# Patient Record
Sex: FEMALE | Race: WHITE | NOT HISPANIC OR LATINO | Employment: OTHER | URBAN - METROPOLITAN AREA
[De-identification: names, ages, dates, MRNs, and addresses within clinical notes are randomized per-mention and may not be internally consistent; named-entity substitution may affect disease eponyms.]

---

## 2017-01-19 ENCOUNTER — OFFICE VISIT - HEALTHEAST (OUTPATIENT)
Dept: FAMILY MEDICINE | Facility: CLINIC | Age: 67
End: 2017-01-19

## 2017-01-19 ENCOUNTER — RECORDS - HEALTHEAST (OUTPATIENT)
Dept: GENERAL RADIOLOGY | Facility: CLINIC | Age: 67
End: 2017-01-19

## 2017-01-19 DIAGNOSIS — M79.602 LEFT ARM PAIN: ICD-10-CM

## 2017-01-19 DIAGNOSIS — M79.602 PAIN IN LEFT ARM: ICD-10-CM

## 2017-01-19 ASSESSMENT — MIFFLIN-ST. JEOR: SCORE: 1207.98

## 2017-05-18 ENCOUNTER — COMMUNICATION - HEALTHEAST (OUTPATIENT)
Dept: FAMILY MEDICINE | Facility: CLINIC | Age: 67
End: 2017-05-18

## 2017-05-18 DIAGNOSIS — E03.9 UNSPECIFIED HYPOTHYROIDISM: ICD-10-CM

## 2017-05-18 DIAGNOSIS — E78.5 HYPERLIPIDEMIA: ICD-10-CM

## 2017-08-28 ENCOUNTER — COMMUNICATION - HEALTHEAST (OUTPATIENT)
Dept: FAMILY MEDICINE | Facility: CLINIC | Age: 67
End: 2017-08-28

## 2017-08-28 DIAGNOSIS — E78.5 HYPERLIPIDEMIA: ICD-10-CM

## 2017-08-29 ENCOUNTER — COMMUNICATION - HEALTHEAST (OUTPATIENT)
Dept: FAMILY MEDICINE | Facility: CLINIC | Age: 67
End: 2017-08-29

## 2017-08-29 DIAGNOSIS — E78.5 HYPERLIPIDEMIA: ICD-10-CM

## 2017-09-01 ENCOUNTER — COMMUNICATION - HEALTHEAST (OUTPATIENT)
Dept: FAMILY MEDICINE | Facility: CLINIC | Age: 67
End: 2017-09-01

## 2017-09-01 DIAGNOSIS — E78.5 HYPERLIPIDEMIA: ICD-10-CM

## 2017-09-05 ENCOUNTER — COMMUNICATION - HEALTHEAST (OUTPATIENT)
Dept: FAMILY MEDICINE | Facility: CLINIC | Age: 67
End: 2017-09-05

## 2017-09-07 ENCOUNTER — OFFICE VISIT - HEALTHEAST (OUTPATIENT)
Dept: FAMILY MEDICINE | Facility: CLINIC | Age: 67
End: 2017-09-07

## 2017-09-07 DIAGNOSIS — M89.9 DISORDER OF BONE AND CARTILAGE: ICD-10-CM

## 2017-09-07 DIAGNOSIS — Z23 PNEUMOCOCCAL VACCINATION GIVEN: ICD-10-CM

## 2017-09-07 DIAGNOSIS — E78.5 HYPERLIPIDEMIA, UNSPECIFIED HYPERLIPIDEMIA TYPE: ICD-10-CM

## 2017-09-07 DIAGNOSIS — M94.9 DISORDER OF BONE AND CARTILAGE: ICD-10-CM

## 2017-09-07 DIAGNOSIS — E03.9 HYPOTHYROIDISM, UNSPECIFIED TYPE: ICD-10-CM

## 2017-09-07 DIAGNOSIS — Z12.31 VISIT FOR SCREENING MAMMOGRAM: ICD-10-CM

## 2017-09-07 DIAGNOSIS — Z76.89 ENCOUNTER TO ESTABLISH CARE: ICD-10-CM

## 2017-09-07 DIAGNOSIS — Z23 FLU VACCINE NEED: ICD-10-CM

## 2017-09-07 DIAGNOSIS — E66.3 OVERWEIGHT (BMI 25.0-29.9): ICD-10-CM

## 2017-09-07 LAB
CHOLEST SERPL-MCNC: 291 MG/DL
FASTING STATUS PATIENT QL REPORTED: YES
HDLC SERPL-MCNC: 70 MG/DL
LDLC SERPL CALC-MCNC: 191 MG/DL
TRIGL SERPL-MCNC: 150 MG/DL

## 2017-09-07 ASSESSMENT — MIFFLIN-ST. JEOR: SCORE: 1238.71

## 2017-09-11 ENCOUNTER — COMMUNICATION - HEALTHEAST (OUTPATIENT)
Dept: FAMILY MEDICINE | Facility: CLINIC | Age: 67
End: 2017-09-11

## 2017-09-11 DIAGNOSIS — E03.9 UNSPECIFIED HYPOTHYROIDISM: ICD-10-CM

## 2017-09-21 ENCOUNTER — RECORDS - HEALTHEAST (OUTPATIENT)
Dept: BONE DENSITY | Facility: CLINIC | Age: 67
End: 2017-09-21

## 2017-09-21 ENCOUNTER — HOSPITAL ENCOUNTER (OUTPATIENT)
Dept: MAMMOGRAPHY | Facility: CLINIC | Age: 67
Discharge: HOME | End: 2017-09-21
Attending: NURSE PRACTITIONER

## 2017-09-21 ENCOUNTER — RECORDS - HEALTHEAST (OUTPATIENT)
Dept: ADMINISTRATIVE | Facility: OTHER | Age: 67
End: 2017-09-21

## 2017-09-21 DIAGNOSIS — Z12.31 VISIT FOR SCREENING MAMMOGRAM: ICD-10-CM

## 2017-09-21 DIAGNOSIS — M89.9 DISORDER OF BONE, UNSPECIFIED: ICD-10-CM

## 2017-09-21 DIAGNOSIS — M94.9 DISORDER OF CARTILAGE, UNSPECIFIED: ICD-10-CM

## 2018-01-19 ENCOUNTER — COMMUNICATION - HEALTHEAST (OUTPATIENT)
Dept: FAMILY MEDICINE | Facility: CLINIC | Age: 68
End: 2018-01-19

## 2018-01-19 DIAGNOSIS — E03.9 HYPOTHYROIDISM: ICD-10-CM

## 2018-01-19 DIAGNOSIS — E78.5 HYPERLIPEMIA: ICD-10-CM

## 2018-03-27 ENCOUNTER — COMMUNICATION - HEALTHEAST (OUTPATIENT)
Dept: FAMILY MEDICINE | Facility: CLINIC | Age: 68
End: 2018-03-27

## 2018-03-27 DIAGNOSIS — E03.9 HYPOTHYROIDISM: ICD-10-CM

## 2018-03-27 DIAGNOSIS — E78.5 HYPERLIPEMIA: ICD-10-CM

## 2018-09-07 ENCOUNTER — AMBULATORY - HEALTHEAST (OUTPATIENT)
Dept: FAMILY MEDICINE | Facility: CLINIC | Age: 68
End: 2018-09-07

## 2018-09-07 ENCOUNTER — RECORDS - HEALTHEAST (OUTPATIENT)
Dept: GENERAL RADIOLOGY | Facility: CLINIC | Age: 68
End: 2018-09-07

## 2018-09-07 ENCOUNTER — OFFICE VISIT - HEALTHEAST (OUTPATIENT)
Dept: FAMILY MEDICINE | Facility: CLINIC | Age: 68
End: 2018-09-07

## 2018-09-07 DIAGNOSIS — M25.532 LEFT WRIST PAIN: ICD-10-CM

## 2018-09-07 DIAGNOSIS — E78.5 HYPERLIPEMIA: ICD-10-CM

## 2018-09-07 DIAGNOSIS — Z23 NEED FOR VACCINATION: ICD-10-CM
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
## 2018-09-07 DIAGNOSIS — E55.9 VITAMIN D DEFICIENCY: ICD-10-CM

## 2018-09-07 DIAGNOSIS — E03.9 HYPOTHYROIDISM, UNSPECIFIED TYPE: ICD-10-CM

## 2018-09-07 DIAGNOSIS — M25.532 PAIN IN LEFT WRIST: ICD-10-CM

## 2018-09-07 DIAGNOSIS — Z00.00 ANNUAL PHYSICAL EXAM: ICD-10-CM

## 2018-09-07 DIAGNOSIS — Z00.00 ROUTINE GENERAL MEDICAL EXAMINATION AT A HEALTH CARE FACILITY: ICD-10-CM

## 2018-09-07 DIAGNOSIS — S62.102A FRACTURE OF WRIST, LEFT, CLOSED, INITIAL ENCOUNTER: ICD-10-CM

## 2018-09-07 DIAGNOSIS — L91.8 SKIN TAG: ICD-10-CM
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
## 2018-09-07 LAB
ALBUMIN SERPL-MCNC: 4.3 G/DL (ref 3.5–5)
ALP SERPL-CCNC: 113 U/L (ref 45–120)
ALT SERPL W P-5'-P-CCNC: 19 U/L (ref 0–45)
ANION GAP SERPL CALCULATED.3IONS-SCNC: 13 MMOL/L (ref 5–18)
AST SERPL W P-5'-P-CCNC: 24 U/L (ref 0–40)
BILIRUB SERPL-MCNC: 0.6 MG/DL (ref 0–1)
BUN SERPL-MCNC: 14 MG/DL (ref 8–22)
CALCIUM SERPL-MCNC: 10.8 MG/DL (ref 8.5–10.5)
CHLORIDE BLD-SCNC: 101 MMOL/L (ref 98–107)
CHOLEST SERPL-MCNC: 284 MG/DL
CO2 SERPL-SCNC: 25 MMOL/L (ref 22–31)
CREAT SERPL-MCNC: 0.73 MG/DL (ref 0.6–1.1)
FASTING STATUS PATIENT QL REPORTED: NO
GFR SERPL CREATININE-BSD FRML MDRD: >60 ML/MIN/1.73M2
GLUCOSE BLD-MCNC: 82 MG/DL (ref 70–125)
HDLC SERPL-MCNC: 72 MG/DL
HGB BLD-MCNC: 13.3 G/DL (ref 12–16)
LDLC SERPL CALC-MCNC: 186 MG/DL
POTASSIUM BLD-SCNC: 4.2 MMOL/L (ref 3.5–5)
PROT SERPL-MCNC: 7.6 G/DL (ref 6–8)
SODIUM SERPL-SCNC: 139 MMOL/L (ref 136–145)
TRIGL SERPL-MCNC: 132 MG/DL
TSH SERPL DL<=0.005 MIU/L-ACNC: 0.38 UIU/ML (ref 0.3–5)

## 2018-09-07 ASSESSMENT — MIFFLIN-ST. JEOR: SCORE: 1226.69

## 2018-09-10 LAB
25(OH)D3 SERPL-MCNC: 31.5 NG/ML (ref 30–80)
25(OH)D3 SERPL-MCNC: 31.5 NG/ML (ref 30–80)

## 2018-09-11 ENCOUNTER — COMMUNICATION - HEALTHEAST (OUTPATIENT)
Dept: FAMILY MEDICINE | Facility: CLINIC | Age: 68
End: 2018-09-11

## 2018-09-17 ENCOUNTER — RECORDS - HEALTHEAST (OUTPATIENT)
Dept: ADMINISTRATIVE | Facility: OTHER | Age: 68
End: 2018-09-17

## 2018-09-25 ENCOUNTER — HOSPITAL ENCOUNTER (OUTPATIENT)
Dept: MAMMOGRAPHY | Facility: CLINIC | Age: 68
Discharge: HOME | End: 2018-09-25
Attending: NURSE PRACTITIONER

## 2018-09-25 DIAGNOSIS — Z12.31 VISIT FOR SCREENING MAMMOGRAM: ICD-10-CM

## 2018-10-04 ENCOUNTER — RECORDS - HEALTHEAST (OUTPATIENT)
Dept: ADMINISTRATIVE | Facility: OTHER | Age: 68
End: 2018-10-04

## 2018-10-05 ENCOUNTER — COMMUNICATION - HEALTHEAST (OUTPATIENT)
Dept: FAMILY MEDICINE | Facility: CLINIC | Age: 68
End: 2018-10-05

## 2018-10-05 DIAGNOSIS — E03.9 HYPOTHYROIDISM: ICD-10-CM

## 2018-10-11 ENCOUNTER — COMMUNICATION - HEALTHEAST (OUTPATIENT)
Dept: FAMILY MEDICINE | Facility: CLINIC | Age: 68
End: 2018-10-11

## 2018-10-11 DIAGNOSIS — E78.5 HYPERLIPEMIA: ICD-10-CM

## 2018-10-25 ENCOUNTER — RECORDS - HEALTHEAST (OUTPATIENT)
Dept: ADMINISTRATIVE | Facility: OTHER | Age: 68
End: 2018-10-25

## 2019-01-09 ENCOUNTER — COMMUNICATION - HEALTHEAST (OUTPATIENT)
Dept: FAMILY MEDICINE | Facility: CLINIC | Age: 69
End: 2019-01-09

## 2019-03-12 ENCOUNTER — COMMUNICATION - HEALTHEAST (OUTPATIENT)
Dept: SCHEDULING | Facility: CLINIC | Age: 69
End: 2019-03-12

## 2019-04-06 ENCOUNTER — COMMUNICATION - HEALTHEAST (OUTPATIENT)
Dept: SCHEDULING | Facility: CLINIC | Age: 69
End: 2019-04-06

## 2019-04-06 DIAGNOSIS — E78.5 HYPERLIPEMIA: ICD-10-CM

## 2019-04-26 ENCOUNTER — COMMUNICATION - HEALTHEAST (OUTPATIENT)
Dept: SCHEDULING | Facility: CLINIC | Age: 69
End: 2019-04-26

## 2019-04-26 DIAGNOSIS — E78.5 HYPERLIPEMIA: ICD-10-CM

## 2019-05-31 ENCOUNTER — OFFICE VISIT - HEALTHEAST (OUTPATIENT)
Dept: FAMILY MEDICINE | Facility: CLINIC | Age: 69
End: 2019-05-31

## 2019-05-31 DIAGNOSIS — M25.511 ACUTE PAIN OF RIGHT SHOULDER: ICD-10-CM

## 2019-05-31 DIAGNOSIS — M79.621 PAIN OF RIGHT UPPER ARM: ICD-10-CM

## 2019-05-31 RX ORDER — LATANOPROST 50 UG/ML
SOLUTION/ DROPS OPHTHALMIC
Status: SHIPPED | COMMUNITY
Start: 2019-05-30 | End: 2023-10-11

## 2019-05-31 ASSESSMENT — MIFFLIN-ST. JEOR: SCORE: 1244.83

## 2019-06-04 ENCOUNTER — HOSPITAL ENCOUNTER (OUTPATIENT)
Dept: MRI IMAGING | Facility: CLINIC | Age: 69
Discharge: HOME | End: 2019-06-04
Attending: NURSE PRACTITIONER

## 2019-06-04 DIAGNOSIS — M25.511 ACUTE PAIN OF RIGHT SHOULDER: ICD-10-CM

## 2019-06-06 ENCOUNTER — COMMUNICATION - HEALTHEAST (OUTPATIENT)
Dept: FAMILY MEDICINE | Facility: CLINIC | Age: 69
End: 2019-06-06

## 2019-06-06 ENCOUNTER — AMBULATORY - HEALTHEAST (OUTPATIENT)
Dept: FAMILY MEDICINE | Facility: CLINIC | Age: 69
End: 2019-06-06

## 2019-06-07 ENCOUNTER — RECORDS - HEALTHEAST (OUTPATIENT)
Dept: ADMINISTRATIVE | Facility: OTHER | Age: 69
End: 2019-06-07

## 2019-06-17 ENCOUNTER — COMMUNICATION - HEALTHEAST (OUTPATIENT)
Dept: SCHEDULING | Facility: CLINIC | Age: 69
End: 2019-06-17

## 2019-06-17 ENCOUNTER — COMMUNICATION - HEALTHEAST (OUTPATIENT)
Dept: FAMILY MEDICINE | Facility: CLINIC | Age: 69
End: 2019-06-17

## 2019-06-18 ENCOUNTER — RECORDS - HEALTHEAST (OUTPATIENT)
Dept: ADMINISTRATIVE | Facility: OTHER | Age: 69
End: 2019-06-18

## 2019-06-26 ENCOUNTER — COMMUNICATION - HEALTHEAST (OUTPATIENT)
Dept: FAMILY MEDICINE | Facility: CLINIC | Age: 69
End: 2019-06-26

## 2019-06-26 DIAGNOSIS — E78.5 HYPERLIPEMIA: ICD-10-CM

## 2019-08-02 ENCOUNTER — RECORDS - HEALTHEAST (OUTPATIENT)
Dept: ADMINISTRATIVE | Facility: OTHER | Age: 69
End: 2019-08-02

## 2019-09-17 ENCOUNTER — OFFICE VISIT - HEALTHEAST (OUTPATIENT)
Dept: FAMILY MEDICINE | Facility: CLINIC | Age: 69
End: 2019-09-17

## 2019-09-17 ENCOUNTER — RECORDS - HEALTHEAST (OUTPATIENT)
Dept: ADMINISTRATIVE | Facility: OTHER | Age: 69
End: 2019-09-17

## 2019-09-17 DIAGNOSIS — Z00.00 ROUTINE GENERAL MEDICAL EXAMINATION AT A HEALTH CARE FACILITY: ICD-10-CM

## 2019-09-17 DIAGNOSIS — L91.8 SKIN TAG: ICD-10-CM

## 2019-09-17 DIAGNOSIS — E78.5 HYPERLIPIDEMIA, UNSPECIFIED HYPERLIPIDEMIA TYPE: ICD-10-CM

## 2019-09-17 DIAGNOSIS — Z23 NEED FOR VACCINATION: ICD-10-CM

## 2019-09-17 DIAGNOSIS — M94.9 DISORDER OF BONE AND CARTILAGE: ICD-10-CM

## 2019-09-17 DIAGNOSIS — D22.9 CHANGE IN MOLE: ICD-10-CM

## 2019-09-17 DIAGNOSIS — E55.9 VITAMIN D DEFICIENCY: ICD-10-CM

## 2019-09-17 DIAGNOSIS — M89.9 DISORDER OF BONE AND CARTILAGE: ICD-10-CM

## 2019-09-17 DIAGNOSIS — E03.9 HYPOTHYROIDISM, UNSPECIFIED TYPE: ICD-10-CM

## 2019-09-17 LAB
ANION GAP SERPL CALCULATED.3IONS-SCNC: 11 MMOL/L (ref 5–18)
BUN SERPL-MCNC: 9 MG/DL (ref 8–22)
CALCIUM SERPL-MCNC: 10.4 MG/DL (ref 8.5–10.5)
CHLORIDE BLD-SCNC: 104 MMOL/L (ref 98–107)
CHOLEST SERPL-MCNC: 249 MG/DL
CO2 SERPL-SCNC: 25 MMOL/L (ref 22–31)
CREAT SERPL-MCNC: 0.76 MG/DL (ref 0.6–1.1)
FASTING STATUS PATIENT QL REPORTED: YES
GFR SERPL CREATININE-BSD FRML MDRD: >60 ML/MIN/1.73M2
GLUCOSE BLD-MCNC: 101 MG/DL (ref 70–125)
HDLC SERPL-MCNC: 80 MG/DL
HGB BLD-MCNC: 13.4 G/DL (ref 12–16)
LDLC SERPL CALC-MCNC: 145 MG/DL
POTASSIUM BLD-SCNC: 4.5 MMOL/L (ref 3.5–5)
SODIUM SERPL-SCNC: 140 MMOL/L (ref 136–145)
TRIGL SERPL-MCNC: 119 MG/DL
TSH SERPL DL<=0.005 MIU/L-ACNC: 0.37 UIU/ML (ref 0.3–5)

## 2019-09-17 ASSESSMENT — ANXIETY QUESTIONNAIRES
1. FEELING NERVOUS, ANXIOUS, OR ON EDGE: NOT AT ALL
2. NOT BEING ABLE TO STOP OR CONTROL WORRYING: NOT AT ALL

## 2019-09-17 ASSESSMENT — MIFFLIN-ST. JEOR: SCORE: 1231.22

## 2019-09-18 LAB
25(OH)D3 SERPL-MCNC: 26.1 NG/ML (ref 30–80)
25(OH)D3 SERPL-MCNC: 26.1 NG/ML (ref 30–80)
HCV AB SERPL QL IA: NEGATIVE

## 2019-09-24 ENCOUNTER — COMMUNICATION - HEALTHEAST (OUTPATIENT)
Dept: FAMILY MEDICINE | Facility: CLINIC | Age: 69
End: 2019-09-24

## 2019-09-25 ENCOUNTER — COMMUNICATION - HEALTHEAST (OUTPATIENT)
Dept: FAMILY MEDICINE | Facility: CLINIC | Age: 69
End: 2019-09-25

## 2019-09-27 ENCOUNTER — RECORDS - HEALTHEAST (OUTPATIENT)
Dept: ADMINISTRATIVE | Facility: OTHER | Age: 69
End: 2019-09-27

## 2019-09-27 ENCOUNTER — HOSPITAL ENCOUNTER (OUTPATIENT)
Dept: MAMMOGRAPHY | Facility: CLINIC | Age: 69
Discharge: HOME | End: 2019-09-27
Attending: NURSE PRACTITIONER

## 2019-09-27 ENCOUNTER — RECORDS - HEALTHEAST (OUTPATIENT)
Dept: BONE DENSITY | Facility: CLINIC | Age: 69
End: 2019-09-27

## 2019-09-27 DIAGNOSIS — M94.9 DISORDER OF CARTILAGE, UNSPECIFIED: ICD-10-CM

## 2019-09-27 DIAGNOSIS — M89.9 DISORDER OF BONE, UNSPECIFIED: ICD-10-CM

## 2019-09-27 DIAGNOSIS — Z12.31 VISIT FOR SCREENING MAMMOGRAM: ICD-10-CM

## 2019-10-01 ENCOUNTER — AMBULATORY - HEALTHEAST (OUTPATIENT)
Dept: FAMILY MEDICINE | Facility: CLINIC | Age: 69
End: 2019-10-01

## 2019-10-01 ENCOUNTER — RECORDS - HEALTHEAST (OUTPATIENT)
Dept: ADMINISTRATIVE | Facility: OTHER | Age: 69
End: 2019-10-01

## 2019-10-01 DIAGNOSIS — M85.80 OSTEOPENIA, UNSPECIFIED LOCATION: ICD-10-CM

## 2019-10-02 ENCOUNTER — COMMUNICATION - HEALTHEAST (OUTPATIENT)
Dept: FAMILY MEDICINE | Facility: CLINIC | Age: 69
End: 2019-10-02

## 2019-10-02 DIAGNOSIS — E03.9 HYPOTHYROIDISM: ICD-10-CM

## 2020-03-10 ENCOUNTER — COMMUNICATION - HEALTHEAST (OUTPATIENT)
Dept: FAMILY MEDICINE | Facility: CLINIC | Age: 70
End: 2020-03-10

## 2020-03-12 ENCOUNTER — OFFICE VISIT - HEALTHEAST (OUTPATIENT)
Dept: FAMILY MEDICINE | Facility: CLINIC | Age: 70
End: 2020-03-12

## 2020-03-12 DIAGNOSIS — M94.9 DISORDER OF BONE AND CARTILAGE: ICD-10-CM

## 2020-03-12 DIAGNOSIS — H25.9 AGE-RELATED CATARACT OF BOTH EYES, UNSPECIFIED AGE-RELATED CATARACT TYPE: ICD-10-CM

## 2020-03-12 DIAGNOSIS — M89.9 DISORDER OF BONE AND CARTILAGE: ICD-10-CM

## 2020-03-12 DIAGNOSIS — E55.9 VITAMIN D DEFICIENCY: ICD-10-CM

## 2020-03-12 DIAGNOSIS — Z01.818 ENCOUNTER FOR PREOPERATIVE EXAMINATION FOR GENERAL SURGICAL PROCEDURE: ICD-10-CM

## 2020-03-12 DIAGNOSIS — H40.003 GLAUCOMA SUSPECT, BILATERAL: ICD-10-CM

## 2020-03-12 RX ORDER — TIMOLOL MALEATE 5 MG/ML
SOLUTION/ DROPS OPHTHALMIC
Qty: 10 ML | Refills: 12 | Status: SHIPPED | COMMUNITY
Start: 2020-03-12 | End: 2023-10-11

## 2020-03-12 ASSESSMENT — MIFFLIN-ST. JEOR: SCORE: 1245.11

## 2020-08-18 ENCOUNTER — RECORDS - HEALTHEAST (OUTPATIENT)
Dept: ADMINISTRATIVE | Facility: OTHER | Age: 70
End: 2020-08-18

## 2020-09-11 ENCOUNTER — COMMUNICATION - HEALTHEAST (OUTPATIENT)
Dept: SCHEDULING | Facility: CLINIC | Age: 70
End: 2020-09-11

## 2020-09-11 DIAGNOSIS — M85.80 OSTEOPENIA, UNSPECIFIED LOCATION: ICD-10-CM

## 2020-09-11 DIAGNOSIS — E03.9 HYPOTHYROIDISM: ICD-10-CM

## 2020-09-15 ENCOUNTER — COMMUNICATION - HEALTHEAST (OUTPATIENT)
Dept: FAMILY MEDICINE | Facility: CLINIC | Age: 70
End: 2020-09-15

## 2020-09-15 DIAGNOSIS — E78.5 HYPERLIPEMIA: ICD-10-CM

## 2020-10-14 ENCOUNTER — COMMUNICATION - HEALTHEAST (OUTPATIENT)
Dept: SCHEDULING | Facility: CLINIC | Age: 70
End: 2020-10-14

## 2020-10-14 DIAGNOSIS — E03.9 HYPOTHYROIDISM: ICD-10-CM

## 2020-10-25 ENCOUNTER — COMMUNICATION - HEALTHEAST (OUTPATIENT)
Dept: SCHEDULING | Facility: CLINIC | Age: 70
End: 2020-10-25

## 2020-10-25 DIAGNOSIS — E03.9 HYPOTHYROIDISM: ICD-10-CM

## 2020-11-06 ENCOUNTER — OFFICE VISIT - HEALTHEAST (OUTPATIENT)
Dept: FAMILY MEDICINE | Facility: CLINIC | Age: 70
End: 2020-11-06

## 2020-11-06 ENCOUNTER — COMMUNICATION - HEALTHEAST (OUTPATIENT)
Dept: SCHEDULING | Facility: CLINIC | Age: 70
End: 2020-11-06

## 2020-11-06 DIAGNOSIS — F41.9 ANXIETY: ICD-10-CM

## 2020-11-06 DIAGNOSIS — M89.9 DISORDER OF BONE AND CARTILAGE: ICD-10-CM

## 2020-11-06 DIAGNOSIS — Z12.31 VISIT FOR SCREENING MAMMOGRAM: ICD-10-CM

## 2020-11-06 DIAGNOSIS — F32.9 REACTIVE DEPRESSION: ICD-10-CM

## 2020-11-06 DIAGNOSIS — M94.9 DISORDER OF BONE AND CARTILAGE: ICD-10-CM

## 2020-11-06 DIAGNOSIS — E78.5 HYPERLIPIDEMIA, UNSPECIFIED HYPERLIPIDEMIA TYPE: ICD-10-CM

## 2020-11-06 DIAGNOSIS — E03.9 HYPOTHYROIDISM: ICD-10-CM

## 2020-11-06 DIAGNOSIS — E03.9 HYPOTHYROIDISM, UNSPECIFIED TYPE: ICD-10-CM

## 2020-11-06 DIAGNOSIS — E55.9 VITAMIN D DEFICIENCY: ICD-10-CM

## 2020-11-06 DIAGNOSIS — H61.21 IMPACTED CERUMEN OF RIGHT EAR: ICD-10-CM

## 2020-11-06 DIAGNOSIS — Z00.00 ENCOUNTER FOR ANNUAL WELLNESS EXAM IN MEDICARE PATIENT: ICD-10-CM

## 2020-11-06 DIAGNOSIS — Z23 NEED FOR VACCINATION: ICD-10-CM

## 2020-11-06 LAB
ANION GAP SERPL CALCULATED.3IONS-SCNC: 12 MMOL/L (ref 5–18)
BUN SERPL-MCNC: 15 MG/DL (ref 8–22)
CALCIUM SERPL-MCNC: 10.1 MG/DL (ref 8.5–10.5)
CHLORIDE BLD-SCNC: 100 MMOL/L (ref 98–107)
CHOLEST SERPL-MCNC: 288 MG/DL
CO2 SERPL-SCNC: 25 MMOL/L (ref 22–31)
CREAT SERPL-MCNC: 0.78 MG/DL (ref 0.6–1.1)
FASTING STATUS PATIENT QL REPORTED: YES
GFR SERPL CREATININE-BSD FRML MDRD: >60 ML/MIN/1.73M2
GLUCOSE BLD-MCNC: 115 MG/DL (ref 70–125)
HDLC SERPL-MCNC: 66 MG/DL
HGB BLD-MCNC: 13.8 G/DL (ref 12–16)
LDLC SERPL CALC-MCNC: 188 MG/DL
POTASSIUM BLD-SCNC: 4.7 MMOL/L (ref 3.5–5)
SODIUM SERPL-SCNC: 137 MMOL/L (ref 136–145)
TRIGL SERPL-MCNC: 169 MG/DL
TSH SERPL DL<=0.005 MIU/L-ACNC: 2.85 UIU/ML (ref 0.3–5)

## 2020-11-06 ASSESSMENT — ANXIETY QUESTIONNAIRES
GAD7 TOTAL SCORE: 12
7. FEELING AFRAID AS IF SOMETHING AWFUL MIGHT HAPPEN: SEVERAL DAYS
2. NOT BEING ABLE TO STOP OR CONTROL WORRYING: NEARLY EVERY DAY
1. FEELING NERVOUS, ANXIOUS, OR ON EDGE: MORE THAN HALF THE DAYS
4. TROUBLE RELAXING: MORE THAN HALF THE DAYS
3. WORRYING TOO MUCH ABOUT DIFFERENT THINGS: NEARLY EVERY DAY
5. BEING SO RESTLESS THAT IT IS HARD TO SIT STILL: SEVERAL DAYS
IF YOU CHECKED OFF ANY PROBLEMS ON THIS QUESTIONNAIRE, HOW DIFFICULT HAVE THESE PROBLEMS MADE IT FOR YOU TO DO YOUR WORK, TAKE CARE OF THINGS AT HOME, OR GET ALONG WITH OTHER PEOPLE: NOT DIFFICULT AT ALL
6. BECOMING EASILY ANNOYED OR IRRITABLE: NOT AT ALL

## 2020-11-06 ASSESSMENT — MIFFLIN-ST. JEOR: SCORE: 1272.04

## 2020-11-06 ASSESSMENT — PATIENT HEALTH QUESTIONNAIRE - PHQ9: SUM OF ALL RESPONSES TO PHQ QUESTIONS 1-9: 10

## 2020-11-08 ENCOUNTER — AMBULATORY - HEALTHEAST (OUTPATIENT)
Dept: FAMILY MEDICINE | Facility: CLINIC | Age: 70
End: 2020-11-08

## 2020-11-08 DIAGNOSIS — E78.5 HYPERLIPIDEMIA, UNSPECIFIED HYPERLIPIDEMIA TYPE: ICD-10-CM

## 2020-11-08 RX ORDER — LEVOTHYROXINE SODIUM 112 UG/1
TABLET ORAL
Qty: 90 TABLET | Refills: 2 | Status: SHIPPED | OUTPATIENT
Start: 2020-11-08 | End: 2021-09-07

## 2020-11-09 LAB
25(OH)D3 SERPL-MCNC: 22.3 NG/ML (ref 30–80)
25(OH)D3 SERPL-MCNC: 22.3 NG/ML (ref 30–80)

## 2020-11-24 ENCOUNTER — OFFICE VISIT - HEALTHEAST (OUTPATIENT)
Dept: FAMILY MEDICINE | Facility: CLINIC | Age: 70
End: 2020-11-24

## 2020-11-24 DIAGNOSIS — G47.00 INSOMNIA, UNSPECIFIED TYPE: ICD-10-CM

## 2020-11-24 DIAGNOSIS — F41.9 ANXIETY: ICD-10-CM

## 2020-11-24 DIAGNOSIS — F32.9 REACTIVE DEPRESSION: ICD-10-CM

## 2020-11-24 ASSESSMENT — ANXIETY QUESTIONNAIRES
6. BECOMING EASILY ANNOYED OR IRRITABLE: NOT AT ALL
IF YOU CHECKED OFF ANY PROBLEMS ON THIS QUESTIONNAIRE, HOW DIFFICULT HAVE THESE PROBLEMS MADE IT FOR YOU TO DO YOUR WORK, TAKE CARE OF THINGS AT HOME, OR GET ALONG WITH OTHER PEOPLE: NOT DIFFICULT AT ALL
4. TROUBLE RELAXING: SEVERAL DAYS
2. NOT BEING ABLE TO STOP OR CONTROL WORRYING: SEVERAL DAYS
7. FEELING AFRAID AS IF SOMETHING AWFUL MIGHT HAPPEN: MORE THAN HALF THE DAYS
GAD7 TOTAL SCORE: 6
1. FEELING NERVOUS, ANXIOUS, OR ON EDGE: SEVERAL DAYS
5. BEING SO RESTLESS THAT IT IS HARD TO SIT STILL: NOT AT ALL
3. WORRYING TOO MUCH ABOUT DIFFERENT THINGS: SEVERAL DAYS

## 2020-11-24 ASSESSMENT — PATIENT HEALTH QUESTIONNAIRE - PHQ9: SUM OF ALL RESPONSES TO PHQ QUESTIONS 1-9: 4

## 2021-01-28 ENCOUNTER — HOSPITAL ENCOUNTER (OUTPATIENT)
Dept: MAMMOGRAPHY | Facility: CLINIC | Age: 71
Discharge: HOME | End: 2021-01-28
Attending: NURSE PRACTITIONER

## 2021-01-28 DIAGNOSIS — Z12.31 VISIT FOR SCREENING MAMMOGRAM: ICD-10-CM

## 2021-05-05 ENCOUNTER — COMMUNICATION - HEALTHEAST (OUTPATIENT)
Dept: FAMILY MEDICINE | Facility: CLINIC | Age: 71
End: 2021-05-05

## 2021-05-05 DIAGNOSIS — E78.5 HYPERLIPIDEMIA, UNSPECIFIED HYPERLIPIDEMIA TYPE: ICD-10-CM

## 2021-05-05 RX ORDER — LOVASTATIN 40 MG
TABLET ORAL
Qty: 90 TABLET | Refills: 2 | Status: SHIPPED | OUTPATIENT
Start: 2021-05-05 | End: 2021-08-12

## 2021-05-19 ENCOUNTER — COMMUNICATION - HEALTHEAST (OUTPATIENT)
Dept: FAMILY MEDICINE | Facility: CLINIC | Age: 71
End: 2021-05-19

## 2021-05-19 DIAGNOSIS — F32.9 REACTIVE DEPRESSION: ICD-10-CM

## 2021-05-19 DIAGNOSIS — F41.9 ANXIETY: ICD-10-CM

## 2021-05-20 RX ORDER — ESCITALOPRAM OXALATE 10 MG/1
TABLET ORAL
Qty: 90 TABLET | Refills: 1 | Status: SHIPPED | OUTPATIENT
Start: 2021-05-20 | End: 2021-08-08

## 2021-05-26 ENCOUNTER — RECORDS - HEALTHEAST (OUTPATIENT)
Dept: ADMINISTRATIVE | Facility: CLINIC | Age: 71
End: 2021-05-26

## 2021-05-27 ENCOUNTER — RECORDS - HEALTHEAST (OUTPATIENT)
Dept: ADMINISTRATIVE | Facility: CLINIC | Age: 71
End: 2021-05-27

## 2021-05-27 ASSESSMENT — PATIENT HEALTH QUESTIONNAIRE - PHQ9
SUM OF ALL RESPONSES TO PHQ QUESTIONS 1-9: 10
SUM OF ALL RESPONSES TO PHQ QUESTIONS 1-9: 4

## 2021-05-27 NOTE — TELEPHONE ENCOUNTER
Refill Approved    Rx renewed per Medication Renewal Policy. Medication was last renewed on 10/11/18 .    Dior Cespedes, [facility] Connection Triage/Med Refill 4/6/2019     Requested Prescriptions   Pending Prescriptions Disp Refills     lovastatin (MEVACOR) 20 MG tablet [Pharmacy Med Name: LOVASTATIN 20 MG TABLET] 90 tablet 1     Sig: TAKE 1 TABLET BY MOUTH AT BEDTIME    Statins Refill Protocol (Hmg CoA Reductase Inhibitors) Passed - 4/6/2019  8:32 AM       Passed - PCP or prescribing provider visit in past 12 months     Last office visit with prescriber/PCP: 9/7/2017 America Coronado NP OR same dept: Visit date not found OR same specialty: Visit date not found  Last physical: 9/7/2018 Last MTM visit: Visit date not found   Next visit within 3 mo: Visit date not found  Next physical within 3 mo: Visit date not found  Prescriber OR PCP: America Coronado NP  Last diagnosis associated with med order: 1. Hyperlipemia  - lovastatin (MEVACOR) 20 MG tablet [Pharmacy Med Name: LOVASTATIN 20 MG TABLET]; TAKE 1 TABLET BY MOUTH AT BEDTIME  Dispense: 90 tablet; Refill: 1    If protocol passes may refill for 12 months if within 3 months of last provider visit (or a total of 15 months).

## 2021-05-28 ENCOUNTER — RECORDS - HEALTHEAST (OUTPATIENT)
Dept: ADMINISTRATIVE | Facility: CLINIC | Age: 71
End: 2021-05-28

## 2021-05-28 ASSESSMENT — ANXIETY QUESTIONNAIRES
GAD7 TOTAL SCORE: 12
GAD7 TOTAL SCORE: 6

## 2021-05-28 NOTE — TELEPHONE ENCOUNTER
Refill Approved    Rx renewed per Medication Renewal Policy. Medication was last renewed on 4/6/2019.  Last office visit: 9/7/2018 with PCP GENESIS Snyder, [facility] Triage/Med Refill 4/28/2019     Requested Prescriptions   Pending Prescriptions Disp Refills     lovastatin (MEVACOR) 20 MG tablet [Pharmacy Med Name: LOVASTATIN 20 MG TABLET] 90 tablet 1     Sig: TAKE 1 TABLET BY MOUTH AT BEDTIME       Statins Refill Protocol (Hmg CoA Reductase Inhibitors) Passed - 4/26/2019  1:18 AM        Passed - PCP or prescribing provider visit in past 12 months      Last office visit with prescriber/PCP: 9/7/2017 America Coronado NP OR same dept: Visit date not found OR same specialty: Visit date not found  Last physical: 9/7/2018 Last MTM visit: Visit date not found   Next visit within 3 mo: Visit date not found  Next physical within 3 mo: Visit date not found  Prescriber OR PCP: America Coronado NP  Last diagnosis associated with med order: 1. Hyperlipemia  - lovastatin (MEVACOR) 20 MG tablet [Pharmacy Med Name: LOVASTATIN 20 MG TABLET]; TAKE 1 TABLET BY MOUTH AT BEDTIME  Dispense: 90 tablet; Refill: 1    If protocol passes may refill for 12 months if within 3 months of last provider visit (or a total of 15 months).

## 2021-05-29 ENCOUNTER — RECORDS - HEALTHEAST (OUTPATIENT)
Dept: ADMINISTRATIVE | Facility: CLINIC | Age: 71
End: 2021-05-29

## 2021-05-29 NOTE — TELEPHONE ENCOUNTER
Who is calling:  Patient  Reason for Call:  See triage note from today. Patient wants to know if America Coronado NP wants to see her or should she been seen by [facility] again. Patient stated she wants to get another cortisone injection.  Date of last appointment with primary care: 5/31/19  Okay to leave a detailed message: Yes  [phone]

## 2021-05-29 NOTE — TELEPHONE ENCOUNTER
Call from pt       Cortizone shot at [facility] a few wks ago - R shoulder       Has subsequently developed pain upper back - near neck - L side       No problems with movement   Just sharp pain that it constant       At home:   > Ice   > APAP   > Massages     A/P:   > PCP eval would be warranted at this point if devin bothersome        She indicates that she will check with [facility] and call back to set up appt if needed     Rec to cont with at home care as before - can try some warm compresses as well       Nash Matos, RN   Triage and Medication Refills         Reason for Disposition    Patient wants to be seen    Protocols used: SHOULDER PAIN-A-OH

## 2021-05-29 NOTE — PROGRESS NOTES
1. Acute pain of right shoulder  oxyCODONE (ROXICODONE) 5 mg/5 mL solution    MR Shoulder Without Contrast Right   2. Pain of right upper arm  oxyCODONE (ROXICODONE) 5 mg/5 mL solution         ASSESSMENT/PLAN:     The following high BMI interventions were performed this visit: exercise promotion: stretching    1. Acute pain of right shoulder    -Patient instructed to rest, ice the affected area several times per day for 10 minutes at a time, may use compression wrap to the area if pain and swelling occur and elevated the affected area whenever patient is able. May take Tylenol 1000 mg four times per day or Ibuprofen 800 mg three times daily for pain and inflammation.     - oxyCODONE (ROXICODONE) 5 mg/5 mL solution; Take 5 mL (5 mg total) by mouth every 8 (eight) hours as needed for pain.  Dispense: 21 mL; Refill: 0 (short term use only)  - MR Shoulder Without Contrast Right; Future    2. Pain of right upper arm    - oxyCODONE (ROXICODONE) 5 mg/5 mL solution; Take 5 mL (5 mg total) by mouth every 8 (eight) hours as needed for pain.  Dispense: 21 mL; Refill: 0      There are no Patient Instructions on file for this visit.  There are no discontinued medications.  Return if symptoms worsen or fail to improve.          America Coronado NP          SUBJECTIVE:  Sarah Beth Aceves is a 68 y.o. female who presents for evaluation of right shoulder pain.  Onset: 2 weeks.  Pain initially started in the right bicep and has now worked its way to the top surface of the right shoulder.  Pain is constant.  She describes it as a dull, achy sensation that is worse with any sort of movement most specifically when she raises her arm to the side, overhead, behind her across her chest or if she rotates her arm.  She does play pickle ball 2 times a week for several hours at a time.  When her pain initially started, she started to go to some exercise classes but did require weight lifting thinking that this would help resolve her  discomfort.  She has been using kinesiology tape, taking Advil and Aleve, she has also applied icy hot to the area without any decrease in the severity of her discomfort.  She has not noticed any significant swelling or color changes.  She denies any recent falls or trauma.  No difficulty with  strength or dropping items.  Denies numbness or tingling today.  She is rating her pain an 8 out of 10, she would like something for pain today as her pain is waking her up majority of the night.  She is completely unable to sleep on the right side due to her right shoulder pain.  Due to patient's significant limited range of motion today, sent for MRI of the right shoulder to rule out any sort of tear in the rotator cuff.  We discussed the short-term use of her oxycodone for her right shoulder pain, we discussed over-the-counter medications to use in case of constipation, she should not drive on the medication. VSS.  Chief Complaint   Patient presents with     Arm Pain     RT arm pain - rom limited, plays pickel ball         Patient Active Problem List   Diagnosis     Raynaud's Phenomenon     Hypothyroidism     Hyperlipemia     Osteopenia     Vitamin D Deficiency     Skin Neoplasm Of Uncertain Behavior     Menopause Has Occurred     Fall     Left wrist pain     Skin tag       Current Outpatient Medications   Medication Sig Dispense Refill     calcium-vitamin D (CALCIUM-VITAMIN D) 500 mg(1,250mg) -200 unit per tablet Take 1 tablet by mouth 2 (two) times a day with meals.       latanoprost (XALATAN) 0.005 % ophthalmic solution        levothyroxine (SYNTHROID, LEVOTHROID) 112 MCG tablet TAKE 1 TABLET BY MOUTH EVERY DAY AT 6:00 AM. 90 tablet 3     lovastatin (MEVACOR) 20 MG tablet Take 1 tablet (20 mg total) by mouth at bedtime. 90 tablet 0     multivitamin (ONE A DAY) per tablet Take 1 tablet by mouth daily.       oxyCODONE (ROXICODONE) 5 mg/5 mL solution Take 5 mL (5 mg total) by mouth every 8 (eight) hours as needed for  pain. 21 mL 0     No current facility-administered medications for this visit.        Social History     Tobacco Use   Smoking Status Former Smoker     Last attempt to quit: 2005     Years since quittin.4   Smokeless Tobacco Never Used   Tobacco Comment    No exposure       REVIEW OF SYSTEMS: Denies trauma, locking, giving away, fevers, chills, sweating, rash or warmth.      TOBACCO USE:  Social History     Tobacco Use   Smoking Status Former Smoker     Last attempt to quit: 2005     Years since quittin.4   Smokeless Tobacco Never Used   Tobacco Comment    No exposure     Social History     Socioeconomic History     Marital status:      Spouse name: Not on file     Number of children: 2     Years of education: 13     Highest education level: Not on file   Occupational History     Occupation: retired     Employer: [employer]      Comment:    Social Needs     Financial resource strain: Not on file     Food insecurity:     Worry: Not on file     Inability: Not on file     Transportation needs:     Medical: Not on file     Non-medical: Not on file   Tobacco Use     Smoking status: Former Smoker     Last attempt to quit: 2005     Years since quittin.4     Smokeless tobacco: Never Used     Tobacco comment: No exposure   Substance and Sexual Activity     Alcohol use: Yes     Alcohol/week: 1.5 oz     Types: 3 Standard drinks or equivalent per week     Drug use: No     Sexual activity: Not on file   Lifestyle     Physical activity:     Days per week: Not on file     Minutes per session: Not on file     Stress: Not on file   Relationships     Social connections:     Talks on phone: Not on file     Gets together: Not on file     Attends Druze service: Not on file     Active member of club or organization: Not on file     Attends meetings of clubs or organizations: Not on file     Relationship status: Not on file     Intimate partner violence:     Fear of current or ex  partner: Not on file     Emotionally abused: Not on file     Physically abused: Not on file     Forced sexual activity: Not on file   Other Topics Concern     Not on file   Social History Narrative        Two children             OBJECTIVE:            Vitals:    05/31/19 1423   BP: 132/82   Pulse: 94   Resp: 14   Temp: 97.8  F (36.6  C)   SpO2: 98%     Weight: 169 lb (76.7 kg)    Wt Readings from Last 3 Encounters:   05/31/19 169 lb (76.7 kg)   09/07/18 165 lb (74.8 kg)   09/07/17 165 lb 14.4 oz (75.3 kg)     Body mass index is 30.91 kg/m .        Physical Exam:  GENERAL APPEARANCE: A&A, NAD, well hydrated, well nourished  CV: RRR, no M/G/R   LUNGS: CTAB, normal respiratory effort  ABDOMEN: S&NT, no masses, no organomegaly, BS present x4   EXTREMITY: no edema, limited range of motion with lateral side raise, overhead extension, hyperextension, internal and external rotation.  Patient has increased pain with any sort of movement.  Right brachial and radial pulse are palpable, 2+.  Capillary refill time is less than 3 seconds.  She does have pain with palpation across the top portion of the right shoulder.   SKIN:  Normal skin turgor, no lesions/rashes, warm and dry   NEURO: no gross deficits, equal  strength

## 2021-05-29 NOTE — TELEPHONE ENCOUNTER
Results of recent shoulder MRI reviewed with patient. Referred to ortho for further evaluation and treatment. Patient to continue short term use of Oxycodone for pain control until seen by ortho.

## 2021-05-30 ENCOUNTER — RECORDS - HEALTHEAST (OUTPATIENT)
Dept: ADMINISTRATIVE | Facility: CLINIC | Age: 71
End: 2021-05-30

## 2021-05-30 VITALS — HEIGHT: 62 IN | BODY MASS INDEX: 29.44 KG/M2 | WEIGHT: 160 LBS

## 2021-05-30 NOTE — TELEPHONE ENCOUNTER
Refill Approved    Rx renewed per Medication Renewal Policy. Medication was last renewed on 4/28/2019.  Last OV 5/31/2019.    Evelia Hodge, [facility] Triage/Med Refill 6/27/2019     Requested Prescriptions   Pending Prescriptions Disp Refills     lovastatin (MEVACOR) 20 MG tablet [Pharmacy Med Name: LOVASTATIN 20 MG TABLET] 90 tablet 1     Sig: TAKE 1 TABLET BY MOUTH AT BEDTIME       Statins Refill Protocol (Hmg CoA Reductase Inhibitors) Passed - 6/26/2019  2:32 AM        Passed - PCP or prescribing provider visit in past 12 months      Last office visit with prescriber/PCP: 5/31/2019 America Coronado NP OR same dept: 5/31/2019 America Coronado NP OR same specialty: 5/31/2019 America Coronado NP  Last physical: 9/7/2018 Last MTM visit: Visit date not found   Next visit within 3 mo: Visit date not found  Next physical within 3 mo: Visit date not found  Prescriber OR PCP: America Coronado NP  Last diagnosis associated with med order: 1. Hyperlipemia  - lovastatin (MEVACOR) 20 MG tablet [Pharmacy Med Name: LOVASTATIN 20 MG TABLET]; TAKE 1 TABLET BY MOUTH AT BEDTIME  Dispense: 90 tablet; Refill: 1    If protocol passes may refill for 12 months if within 3 months of last provider visit (or a total of 15 months).

## 2021-05-31 ENCOUNTER — RECORDS - HEALTHEAST (OUTPATIENT)
Dept: ADMINISTRATIVE | Facility: CLINIC | Age: 71
End: 2021-05-31

## 2021-05-31 VITALS — WEIGHT: 165.9 LBS | HEIGHT: 63 IN | BODY MASS INDEX: 29.39 KG/M2

## 2021-06-01 ENCOUNTER — TRANSFERRED RECORDS (OUTPATIENT)
Dept: HEALTH INFORMATION MANAGEMENT | Facility: CLINIC | Age: 71
End: 2021-06-01

## 2021-06-01 ENCOUNTER — RECORDS - HEALTHEAST (OUTPATIENT)
Dept: ADMINISTRATIVE | Facility: CLINIC | Age: 71
End: 2021-06-01

## 2021-06-01 VITALS — HEIGHT: 62 IN | WEIGHT: 165 LBS | BODY MASS INDEX: 30.36 KG/M2

## 2021-06-01 NOTE — PROGRESS NOTES
Assessment and Plan:     1. Routine general medical examination at a health care facility  Basic Metabolic Panel    Hemoglobin    Hepatitis C Antibody (Anti-HCV)   2. Hypothyroidism, unspecified type  Thyroid Stimulating Hormone (TSH)   3. Hyperlipidemia, unspecified hyperlipidemia type  Lipid Cascade FASTING   4. Vitamin D deficiency  Vitamin D, Total (25-Hydroxy)   5. Osteopenia  DXA Bone Density Scan   6. Change in [facility]  Ambulatory referral to Dermatology   7. Skin tag     8. Need for vaccination  Influenza High Dose, Seasonal 65+ yrs         1. Routine general medical examination at a health care facility    - Basic Metabolic Panel  - Hemoglobin  - Hepatitis C Antibody (Anti-HCV)    2. Hypothyroidism, unspecified type    - Thyroid Stimulating Hormone (TSH)    3. Hyperlipidemia, unspecified hyperlipidemia type    - Lipid Cascade FASTING    4. Vitamin D deficiency    -taking 1200 units daily  - Vitamin D, Total (25-Hydroxy)    5. Osteopenia    - DXA Bone Density Scan; Future    6. Change in [facility]    - Ambulatory referral to Dermatology    7. Skin tag    -total of 2 skin tags removed from left axillary region. Site prepped and cleaned using topical alcohol. Skin tags removed using surgical scissors and forceps. Each site cauterized x2. No bleeding noted post procedure. Patient tolerated procedure well without incident,.     8. Need for vaccination    -patient will check with insurance provider regarding coverage for shingles vaccination, pharmacy vs. Clinic location  - Influenza High Dose, Seasonal 65+ yrs        The patient's current medical problems were reviewed.    The following high BMI interventions were performed this visit: encouragement to exercise  The following health maintenance schedule was reviewed with the patient and provided in printed form in the after visit summary:   Health Maintenance   Topic Date Due     ZOSTER VACCINES (2 of 3) 06/06/2013     DXA SCAN  09/21/2019     MEDICARE ANNUAL WELLNESS  VISIT  09/17/2020     FALL RISK ASSESSMENT  09/17/2020     MAMMOGRAM  09/25/2020     ADVANCE CARE PLANNING  09/17/2024     COLONOSCOPY  01/15/2026     TD 18+ HE  09/07/2028     HEPATITIS C SCREENING  Completed     PNEUMOCOCCAL POLYSACCHARIDE VACCINE AGE 65 AND OVER  Completed     INFLUENZA VACCINE RULE BASED  Completed     PNEUMOCOCCAL CONJUGATE VACCINE FOR ADULTS (PCV13 OR PREVNAR)  Completed        Subjective:   Chief Complaint: Sarah Beth Aceves is an 69 y.o. female here for an Annual Wellness visit.  No memory concerns. Rates physical health as good. Exercising 3 to 5 days per week. Alcohol use is 0 to 1 drinks daily. She does not eat 4 servings daily for fruits or veggies. Independent with all ADL's. No concerns for hearing loss. No urinary incontinence problems. Mental and emotional health are good, no concerns. Has living will at home, advised to drop off at clinic so copy can be made for chart. No recent falls. She is requesting to have some skin tags removed from left axillary. She would like a consult order placed today due to changes with a mole on left scapula. Glaucoma managed by eye specialist. She is currently taking eye drops for this. She has no symptoms from her glaucoma.   HPI:  Medical, family and surgical history reviewed.  Medical history includes hyperlipidemia, patient is currently taking a statin at this time.  She is not taking a daily baby aspirin.  She does take Synthroid for her hypothyroidism.  Currently supplementing with calcium and vitamin D for her osteopenia confirmed by her DEXA scan in 2017.  She does have a history of vitamin D deficiency. She is up-to-date with her colonoscopy and mammogram.  Denies any abnormal vaginal bleeding or discharge.     Review of Systems:    Please see above.  The rest of the review of systems are negative for all systems.    Patient Care Team:  America Coronado NP as PCP - General (Nurse Practitioner)  America Coronado NP as Assigned PCP      Patient Active Problem List   Diagnosis     Raynaud's Phenomenon     Hypothyroidism     Hyperlipemia     Osteopenia     Vitamin D Deficiency     Skin Neoplasm Of Uncertain Behavior     Menopause Has Occurred     Past Medical History:   Diagnosis Date     Asymptomatic postmenopausal status (age-related) (natural)     age 56     Cervical high risk human papillomavirus (HPV) DNA test positive 2006    ASCUS Pap, favor benign, HRHPV positive, 2006     History of blepharoplasty 2006    Integumentary Prodecures Blepharoplasty     History of pregnancy     NSVDX2     Left radial fracture 2018    evaluated by orthopedic      Past Surgical History:   Procedure Laterality Date     BREAST BIOPSY  08    Description: Biopsy Breast Percutaneous Needle Core;  Proc Date: 2008; 2 areas on the same day.     TUBAL LIGATION        Family History   Problem Relation Age of Onset     Other Mother         Acute bowel Ischemia/Infarction;  2009     Hypertension Mother      Coronary artery disease Mother         Mother had stent age 73     Osteoarthritis Mother         of the Hip; S/P hip replacement     Osteoporosis Mother         severe     Thyroid disease Mother      Aneurysm Father         Aneurysm of the Aortic Arch; Grafted X 2;  age 82     Coronary artery disease Father         S/P angioplasty     Emphysema Father      Uterine cancer Sister 58        Uterine Papillary serous carcinoma, diagnosed age 58, Stage IV. Diagnosed by abnormal Pap.     Osteoporosis Sister         premature menopause in her 30's     Thyroid disease Sister       Social History     Socioeconomic History     Marital status:      Spouse name: Not on file     Number of children: 2     Years of education: 13     Highest education level: Not on file   Occupational History     Occupation: retired     Employer: [employer]      Comment:    Social Needs     Financial resource strain: Not on  file     Food insecurity:     Worry: Not on file     Inability: Not on file     Transportation needs:     Medical: Not on file     Non-medical: Not on file   Tobacco Use     Smoking status: Former Smoker     Last attempt to quit: 2005     Years since quittin.7     Smokeless tobacco: Never Used     Tobacco comment: No exposure   Substance and Sexual Activity     Alcohol use: Yes     Alcohol/week: 2.5 standard drinks     Types: 3 Standard drinks or equivalent per week     Drug use: No     Sexual activity: Not on file   Lifestyle     Physical activity:     Days per week: Not on file     Minutes per session: Not on file     Stress: Not on file   Relationships     Social connections:     Talks on phone: Not on file     Gets together: Not on file     Attends Voodoo service: Not on file     Active member of club or organization: Not on file     Attends meetings of clubs or organizations: Not on file     Relationship status: Not on file     Intimate partner violence:     Fear of current or ex partner: Not on file     Emotionally abused: Not on file     Physically abused: Not on file     Forced sexual activity: Not on file   Other Topics Concern     Not on file   Social History Narrative        Two children          Current Outpatient Medications   Medication Sig Dispense Refill     beta-carotene,A,-vits C,E/mins (OCUVITE ORAL) Take by mouth.       calcium-vitamin D (CALCIUM-VITAMIN D) 500 mg(1,250mg) -200 unit per tablet Take 1 tablet by mouth 2 (two) times a day with meals.       latanoprost (XALATAN) 0.005 % ophthalmic solution        levothyroxine (SYNTHROID, LEVOTHROID) 112 MCG tablet TAKE 1 TABLET BY MOUTH EVERY DAY AT 6:00 AM. 90 tablet 3     lovastatin (MEVACOR) 20 MG tablet TAKE 1 TABLET BY MOUTH AT BEDTIME 90 tablet 3     multivitamin (ONE A DAY) per tablet Take 1 tablet by mouth daily.       No current facility-administered medications for this visit.       Objective:   Vital Signs:   Visit  "Vitals  /80   Pulse 85   Temp 98.4  F (36.9  C)   Resp 14   Ht 5' 2\" (1.575 m)   Wt 166 lb (75.3 kg)   SpO2 98%   Breastfeeding? No   BMI 30.36 kg/m         VisionScreening:  No exam data present     PHYSICAL EXAM    Review of Systems     Denies fever, chills, visual changes, fatigue, myalgias, nasal congestion, rhinorrhea, ear pain, or discharge, sore throat, swollen glands, breast mass, nipple discharge, breast changes, abdominal pain, cough, shortness of breath, chest pain, weight change, change in bowel habits, melena, rectal bleeding, dysuria, frequency, urgency, hematuria, polyuria, polydipsia, polyphagia, joint pain or swelling or erythema, edema, rash, weakness, paresthesias, vaginal discharge or bleeding or mood changes.     Positive: None  Objective:         /80   Pulse 85   Temp 98.4  F (36.9  C)   Resp 14   Ht 5' 2\" (1.575 m)   Wt 166 lb (75.3 kg)   SpO2 98%   Breastfeeding? No   BMI 30.36 kg/m       Physical Exam:  General Appearance: Alert, cooperative, no distress, appears stated age  Head: Normocephalic, without obvious abnormality, atraumatic  Eyes: PERRL, conjunctiva/corneas clear, EOM's intact  Ears: Normal TM's and external ear canals, both ears  Nose: Nares normal, septum midline,mucosa normal, no drainage  Throat: Lips, mucosa, and tongue normal; teeth and gums normal  Neck: Supple, symmetrical, trachea midline, no adenopathy;  thyroid: not enlarged, symmetric, no tenderness/mass/nodules; no carotid bruit or JVD  Back: Symmetric, no curvature, ROM normal, no CVA tenderness  Lungs: Clear to auscultation bilaterally, respirations unlabored  Heart: Regular rate and rhythm, S1 and S2 normal, no murmur, rub, or gallop, no edema. Bilateral femoral, pedal and post-tibial pulses are equal and palpable, 2+  Abdomen: Soft, non-tender, bowel sounds active all four quadrants,  no masses, no organomegaly  Extremities: Extremities normal, atraumatic, no cyanosis or edema  Skin: Skin " color, texture, turgor normal, no rashes or lesions, abnormal mole left scapula, large and dark in color, moderately raised. Two skin tags noted in left axillary.   Lymph nodes: Cervical, supraclavicular, and axillary nodes normal  Neurologic: Normal DTR's, no gross deficits             Assessment Results 9/17/2019   Activities of Daily Living No help needed   Instrumental Activities of Daily Living No help needed   Get Up and Go Score Less than 12 seconds   Mini Cog Total Score 5   Some recent data might be hidden     A Mini-Cog score of 0-2 suggests the possibility of dementia, score of 3-5 suggests no dementia    Identified Health Risks:     She is at risk for falling and has been provided with information to reduce the risk of falling at home.  Patient's advanced directive was discussed and I am comfortable with the patient's wishes.

## 2021-06-01 NOTE — TELEPHONE ENCOUNTER
Refill Approved    Rx renewed per Medication Renewal Policy. Medication was last renewed on 10/5/18.    Belén Cohen, [facility] Triage/Med Refill 10/2/2019     Requested Prescriptions   Pending Prescriptions Disp Refills     levothyroxine (SYNTHROID, LEVOTHROID) 112 MCG tablet [Pharmacy Med Name: LEVOTHYROXINE 112 MCG TABLET] 90 tablet 2     Sig: TAKE 1 TABLET BY MOUTH EVERY DAY AT 6AM       Thyroid Hormones Protocol Passed - 10/2/2019  1:56 AM        Passed - Provider visit in past 12 months or next 3 months     Last office visit with prescriber/PCP: 5/31/2019 America Coronado NP OR same dept: 5/31/2019 America Coronado NP OR same specialty: 5/31/2019 America Coronado NP  Last physical: 9/17/2019 Last MTM visit: Visit date not found   Next visit within 3 mo: Visit date not found  Next physical within 3 mo: Visit date not found  Prescriber OR PCP: America Coronado NP  Last diagnosis associated with med order: 1. Hypothyroidism  - levothyroxine (SYNTHROID, LEVOTHROID) 112 MCG tablet [Pharmacy Med Name: LEVOTHYROXINE 112 MCG TABLET]; TAKE 1 TABLET BY MOUTH EVERY DAY AT 6AM  Dispense: 90 tablet; Refill: 2    If protocol passes may refill for 12 months if within 3 months of last provider visit (or a total of 15 months).             Passed - TSH on file in past 12 months for patient age 12 & older     TSH   Date Value Ref Range Status   09/17/2019 0.37 0.30 - 5.00 uIU/mL Final

## 2021-06-03 VITALS — WEIGHT: 169 LBS | HEIGHT: 62 IN | BODY MASS INDEX: 31.1 KG/M2

## 2021-06-03 VITALS
OXYGEN SATURATION: 98 % | HEART RATE: 85 BPM | WEIGHT: 166 LBS | SYSTOLIC BLOOD PRESSURE: 128 MMHG | DIASTOLIC BLOOD PRESSURE: 80 MMHG | BODY MASS INDEX: 30.55 KG/M2 | RESPIRATION RATE: 14 BRPM | TEMPERATURE: 98.4 F | HEIGHT: 62 IN

## 2021-06-04 VITALS
HEART RATE: 86 BPM | SYSTOLIC BLOOD PRESSURE: 130 MMHG | HEIGHT: 62 IN | DIASTOLIC BLOOD PRESSURE: 82 MMHG | WEIGHT: 169.06 LBS | OXYGEN SATURATION: 94 % | BODY MASS INDEX: 31.11 KG/M2 | TEMPERATURE: 98 F

## 2021-06-05 VITALS
HEIGHT: 62 IN | TEMPERATURE: 98 F | SYSTOLIC BLOOD PRESSURE: 140 MMHG | BODY MASS INDEX: 32.2 KG/M2 | WEIGHT: 175 LBS | OXYGEN SATURATION: 98 % | HEART RATE: 68 BPM | RESPIRATION RATE: 16 BRPM | DIASTOLIC BLOOD PRESSURE: 68 MMHG

## 2021-06-06 NOTE — TELEPHONE ENCOUNTER
New Appointment Needed  What is the reason for the visit:    Pre-Op Appt Request  When is the surgery? : 3/25/2020  [facility]  Who is the surgeon? :  Dr. Scherer  What type of surgery is being done?: Cataract Surgery  Provider Preference: PCP only  How soon do you need to be seen?: As soon as possible  Okay to leave a detailed message:  Yes

## 2021-06-06 NOTE — PROGRESS NOTES
Preoperative Exam    Scheduled Procedure: Cataract   Surgery Date:  3/26/2020 and 4/9/2020  Surgery Location: [facility] [place]     Surgeon:  Dr. Scherer     Assessment/Plan:     1. Encounter for preoperative examination for general surgical procedure  The patient currently has no acute illness that would complicate the patient's proposed upcoming surgery for cataracts.  The patient is not on any medications that are of concern or would need to be stopped.  The patient does not have any chronic conditions that would be a contraindication to proceeding with cataract surgery  There are no particular tests that need to be done before surgery.    2. Age-related cataract of both eyes, unspecified age-related cataract type  Goal is to be able to improve visual acuity and quality of life    3. Glaucoma suspect, bilateral     - timolol maleate (TIMOPTIC) 0.5 % ophthalmic solution; 1 drop daily in each eye  Dispense: 10 mL; Refill: 12  The diagnosis of glaucoma according to the patient is somewhat in question and will be monitored and adjusted per ophthalmology    4. Vitamin D deficiency     - cholecalciferol, vitamin D3, (VITAMIN D3) 25 mcg (1,000 unit) capsule; Take 1 capsule (1,000 Units total) by mouth daily.; Refill: 0  I recommended taking a vitamin D supplement to complement her calcium supplement that she is taking to preserve bone density.    5. Osteopenia  I reviewed the bone density DEXA scan with the patient and answered some of her questions.  Discussed with her that her bone density is mildly below the normal range particularly in her spine and normal in the femur.  Encouraged continued upright exercise such as walking.        Surgical Procedure Risk: Low (reported cardiac risk generally < 1%)  Have you had prior anesthesia?: Yes  Have you or any family members had a previous anesthesia reaction:  No  Do you or any family members have a history of a clotting or bleeding disorder?: No  Cardiac Risk  Assessment: no increased risk for major cardiac complications     PT APPROVED FOR SURGERY:APPROVAL GIVEN to proceed with proposed procedure, without further diagnostic evaluation    Please Note:  Patient does not have any implantable devices that are concerning.    Functional Status: Independent  Patient plans to recover at home with family.     Subjective:      Sarah Beth Aceves is a 69 y.o. female who presents for a preoperative consultation for bilateral cataract surgery    All other systems reviewed and are negative, other than those listed in the HPI.    Pertinent History  Do you have difficulty breathing or chest pain after walking up a flight of stairs: No  History of obstructive sleep apnea: No  Steroid use in the last 6 months: no  Frequent Aspirin/NSAID use: No  Prior Blood Transfusion: No  Prior Blood Transfusion Reaction: No  If for some reason prior to, during or after the procedure, if it is medically indicated, would you be willing to have a blood transfusion?:  There is no transfusion refusal.    Current Outpatient Medications   Medication Sig Dispense Refill     calcium carbonate (OS-ELOISE) 600 mg calcium (1,500 mg) tablet Take 1 tablet (600 mg total) by mouth 2 (two) times a day with meals. 180 tablet 3     latanoprost (XALATAN) 0.005 % ophthalmic solution        levothyroxine (SYNTHROID, LEVOTHROID) 112 MCG tablet TAKE 1 TABLET BY MOUTH EVERY DAY AT 6AM 90 tablet 3     lovastatin (MEVACOR) 20 MG tablet TAKE 1 TABLET BY MOUTH AT BEDTIME 90 tablet 3     multivitamin (ONE A DAY) per tablet Take 1 tablet by mouth daily.       beta-carotene,A,-vits C,E/mins (OCUVITE ORAL) Take by mouth.       No current facility-administered medications for this visit.         No Known Allergies    Patient Active Problem List   Diagnosis     Hypothyroidism     Hyperlipemia     Osteopenia     Vitamin D Deficiency     Skin Neoplasm Of Uncertain Behavior     Menopause Has Occurred       Past Medical History:   Diagnosis  Date     Asymptomatic postmenopausal status (age-related) (natural) 2007    age 56     Cervical high risk human papillomavirus (HPV) DNA test positive 12/4/2006    ASCUS Pap, favor benign, HRHPV positive, December 4, 2006     History of blepharoplasty 2/27/2006    Integumentary Prodecures Blepharoplasty     History of pregnancy     NSVDX2     Left radial fracture 09/17/2018    evaluated by orthopedic     Raynaud's Phenomenon     Created by Conversion        Past Surgical History:   Procedure Laterality Date     BREAST BIOPSY  6/23/08    Description: Biopsy Breast Percutaneous Needle Core;  Proc Date: 06/23/2008; 2 areas on the same day.     TUBAL LIGATION         Social History     Socioeconomic History     Marital status:      Spouse name: Not on file     Number of children: 2     Years of education: 13     Highest education level: Not on file   Occupational History     Occupation: retired     Employer: [employer]      Comment:    Social Needs     Financial resource strain: Not on file     Food insecurity     Worry: Not on file     Inability: Not on file     Transportation needs     Medical: Not on file     Non-medical: Not on file   Tobacco Use     Smoking status: Former Smoker     Last attempt to quit: 1/1/2005     Years since quitting: 15.2     Smokeless tobacco: Never Used     Tobacco comment: No exposure   Substance and Sexual Activity     Alcohol use: Yes     Alcohol/week: 2.5 standard drinks     Types: 3 Standard drinks or equivalent per week     Drug use: No     Sexual activity: Not on file   Lifestyle     Physical activity     Days per week: Not on file     Minutes per session: Not on file     Stress: Not on file   Relationships     Social connections     Talks on phone: Not on file     Gets together: Not on file     Attends Orthodox service: Not on file     Active member of club or organization: Not on file     Attends meetings of clubs or organizations: Not on file     Relationship  "status: Not on file     Intimate partner violence     Fear of current or ex partner: Not on file     Emotionally abused: Not on file     Physically abused: Not on file     Forced sexual activity: Not on file   Other Topics Concern     Not on file   Social History Narrative        Two children           Patient Care Team:  America Coronado NP as PCP - General (Nurse Practitioner)  America Coronado NP as Assigned PCP   Jake Ball MD for preop evaluation            Objective:     Vitals:    03/12/20 1356   BP: 130/82   Pulse: 86   Temp: 98  F (36.7  C)   SpO2: 94%   Weight: 169 lb 1 oz (76.7 kg)   Height: 5' 2\" (1.575 m)         Physical Exam:  Physical Exam  Vitals signs and nursing note reviewed.   Constitutional:       Appearance: Normal appearance. She is not ill-appearing.   HENT:      Head: Normocephalic and atraumatic.      Right Ear: Tympanic membrane and ear canal normal. There is no impacted cerumen.      Left Ear: Tympanic membrane and ear canal normal. There is no impacted cerumen.      Nose: Nose normal.      Mouth/Throat:      Mouth: Mucous membranes are moist.      Pharynx: No oropharyngeal exudate or posterior oropharyngeal erythema.   Eyes:      Extraocular Movements: Extraocular movements intact.      Pupils: Pupils are equal, round, and reactive to light.      Comments: Mild scleral injection bilaterally with normal conjunctiva and bilateral cataracts noted   Neck:      Musculoskeletal: Normal range of motion and neck supple.      Vascular: No carotid bruit.   Cardiovascular:      Rate and Rhythm: Normal rate and regular rhythm.      Pulses: Normal pulses.      Heart sounds: Normal heart sounds.   Pulmonary:      Effort: Pulmonary effort is normal. No respiratory distress.      Breath sounds: No wheezing, rhonchi or rales.   Abdominal:      General: Abdomen is flat. There is no distension.      Palpations: Abdomen is soft. There is no mass.      Tenderness: There is no abdominal " tenderness. There is no guarding.   Musculoskeletal: Normal range of motion.         General: No swelling, tenderness or deformity.      Right lower leg: No edema.      Left lower leg: No edema.   Lymphadenopathy:      Cervical: No cervical adenopathy.   Skin:     General: Skin is warm.      Findings: No rash.   Neurological:      General: No focal deficit present.      Mental Status: She is alert.      Cranial Nerves: No cranial nerve deficit.      Motor: No weakness.      Gait: Gait normal.   Psychiatric:         Mood and Affect: Mood normal.         Behavior: Behavior normal.         Thought Content: Thought content normal.         Judgment: Judgment normal.         Patient Instructions      Before Your Surgery       Call your surgeon if there is any change in your health. This includes signs of a cold or flu (such as a sore throat, runny nose, cough, rash or fever).       Do not smoke, drink alcohol or take over the counter medicine (unless your surgeon or primary care doctor tells you to) for the 24 hours before and after surgery.       If you take prescribed drugs: Follow your doctor s orders about which medicines to take and which to stop until after surgery.      Eating and drinking prior to surgery: follow the instructions from your surgeon.      Take a shower or bath the night before surgery. Use the soap your surgeon gave you to gently clean your skin. If you do not have soap from your surgeon, use your regular soap. Do not shave or scrub the surgery site. Wear clean pajamas and have clean sheets on your bed.             Follow your surgeon's direction on when to stop eating and drinking prior to surgery.    Your surgeon will be managing your pain after your surgery.        EKG: Not indicated    Labs:  No labs were ordered during this visit    Immunization History   Administered Date(s) Administered     DT (pediatric) 05/05/2000     Hep A, historic 12/17/2007, 06/17/2008     Hep B, historic 12/17/2007,  01/29/2008, 08/04/2008     Influenza [id number], 01/11/2010     Influenza high dose,seasonal,PF, 65+ yrs 09/07/2017, 09/07/2018, 09/17/2019     Influenza, inj, historic,unspecified 10/06/2015, 09/20/2016     Influenza, seasonal,quad inj 6-35 mos 12/20/2010     Influenza,seasonal quad, PF, =/> 6months 10/01/2013, 10/02/2014     Influenza,seasonal, Inj IIV3 12/04/2006, 12/20/2010, 09/12/2012     Pneumo Conj 13-V (2010&after) 10/18/2016, 09/07/2017     Pneumo Polysac 23-V 09/07/2018     Td,adult,historic,unspecified 04/14/2008     Tdap 04/14/2008, 09/07/2018     ZOSTER, LIVE 04/11/2013     ZOSTER, RECOMBINANT, IM 09/17/2019, 11/26/2019           Electronically signed by Jake Ball MD 03/13/20 1:58 PM

## 2021-06-08 NOTE — PROGRESS NOTES
Assessment:  #1.  Left arm pain, probably rotator cuff tendinitis.  #2.  Hyperlipidemia, on lovastatin.    Plan: Explained that the humerus x-ray did look okay to me.  Awaiting results of the CBC, hepatic profile and CK.  Recommend she try taking ibuprofen up to 600 mg 3 times a day for the next several weeks as well as doing Codman's exercises and see if the symptoms decrease at all.  If her symptoms worsen or are not improving, we could get her set up for MRI of the left shoulder and subsequent orthopedic consultation.  If they're stable or improving, then follow-up at minimum in April for her annual physical with Dr. burch.    Subjective: 66-year-old female presented for evaluation of left arm pain that she started about 2-1/2 months ago.  It was about a month after she had had her pneumonia vaccine when she started feeling this but she had not had it at all in the first several weeks after that injection.  She did receive that injection in the left arm.  She is not aware of having done any abnormal activity.  But the pain has persisted in the middle part of the left arm which she felt was started in the biceps muscle, and she has used occasional Advil but nothing regular.  Because it is persisting she comes in for evaluation.  It is affected by motion of her arm.  She has no fever nausea vomiting sore throat cough chest pain and trouble breathing or abdominal symptoms.  Past Medical History   Diagnosis Date     Asymptomatic postmenopausal status (age-related) (natural) 2007     age 56     Cervical high risk human papillomavirus (HPV) DNA test positive 12/4/2006     ASCUS Pap, favor benign, HRHPV positive, December 4, 2006     History of blepharoplasty 2/27/2006     Integumentary Prodecures Blepharoplasty     History of pregnancy      NSVDX2     Current Outpatient Prescriptions   Medication Sig Dispense Refill     calcium-vitamin D (CALCIUM-VITAMIN D) 500 mg(1,250mg) -200 unit per tablet Take 1 tablet by mouth 2  "(two) times a day with meals.       cholecalciferol, vitamin D3, (CHOLECALCIFEROL) 1,000 unit tablet Take 1,000 Units by mouth daily.       levothyroxine (SYNTHROID, LEVOTHROID) 112 MCG tablet TAKE ONE TABLET BY MOUTH ONCE DAILY 90 tablet 3     lovastatin (MEVACOR) 10 MG tablet TAKE ONE TABLET BY MOUTH AT BEDTIME 90 tablet 3     multivitamin (ONE A DAY) per tablet Take 1 tablet by mouth daily.       No current facility-administered medications for this visit.      No Known Allergies  All other review of systems fairly negative.    Objective:  Visit Vitals     /70     Pulse 82     Resp 16     Ht 5' 2.25\" (1.581 m)     Wt 160 lb (72.6 kg)     Breastfeeding No     BMI 29.03 kg/m2     HEENT examination is negative.  Neck supple without adenopathy or thyromegaly.  Lungs are clear.  Heart regular rate and rhythm without murmur.  No axillary adenopathy.  Skin is not remarkable.  She has normal forward flexion of the left arm but with abduction, once she gets to 90  then she starts having her left arm pain and it worsens as she tries to abduct further.  She can get the arm up to about 160  with the pain.  Left humerus x-ray does not show any bony abnormality by my reading.  The first AP view was obscured by the bra strap being left on.  Lab results pending.  "

## 2021-06-11 NOTE — TELEPHONE ENCOUNTER
RN cannot approve Refill Request    RN can NOT refill this medication Protocol failed and NO refill given. Last office visit: 5/31/2019 America Coronado NP Last Physical: 9/17/2019 Last MTM visit: Visit date not found Last visit same specialty: Visit date not found.  Next visit within 3 mo: Visit date not found  Next physical within 3 mo: Visit date not found      Freya Nava, [facility] Triage/Med Refill 9/12/2020    Requested Prescriptions   Pending Prescriptions Disp Refills     calcium carbonate (OS-ELOISE) 600 mg calcium (1,500 mg) tablet 180 tablet 3     Sig: Take 1 tablet (600 mg total) by mouth 2 (two) times a day with meals.       There is no refill protocol information for this order        levothyroxine (SYNTHROID, LEVOTHROID) 112 MCG tablet 90 tablet 3     Sig: TAKE 1 TABLET BY MOUTH EVERY DAY AT 6AM       Thyroid Hormones Protocol Passed - 9/11/2020  7:24 PM        Passed - Provider visit in past 12 months or next 3 months     Last office visit with prescriber/PCP: 5/31/2019 America Coronado NP OR same dept: Visit date not found OR same specialty: Visit date not found  Last physical: 9/17/2019 Last MTM visit: Visit date not found   Next visit within 3 mo: Visit date not found  Next physical within 3 mo: Visit date not found  Prescriber OR PCP: America Coronado NP  Last diagnosis associated with med order: 1. Osteopenia, unspecified location  - calcium carbonate (OS-ELOISE) 600 mg calcium (1,500 mg) tablet; Take 1 tablet (600 mg total) by mouth 2 (two) times a day with meals.  Dispense: 180 tablet; Refill: 3    2. Hypothyroidism  - levothyroxine (SYNTHROID, LEVOTHROID) 112 MCG tablet; TAKE 1 TABLET BY MOUTH EVERY DAY AT 6AM  Dispense: 90 tablet; Refill: 3    If protocol passes may refill for 12 months if within 3 months of last provider visit (or a total of 15 months).             Passed - TSH on file in past 12 months for patient age 12 & older     TSH   Date Value Ref Range Status    09/17/2019 0.37 0.30 - 5.00 uIU/mL Final

## 2021-06-11 NOTE — TELEPHONE ENCOUNTER
Refill Approved    Rx renewed per Medication Renewal Policy. Medication was last renewed on 6/27/19.    Belén Cohen, [facility] Triage/Med Refill 9/17/2020     Requested Prescriptions   Pending Prescriptions Disp Refills     lovastatin (MEVACOR) 20 MG tablet 90 tablet 3     Sig: Take 1 tablet (20 mg total) by mouth at bedtime.       Statins Refill Protocol (Hmg CoA Reductase Inhibitors) Passed - 9/15/2020  5:24 PM        Passed - PCP or prescribing provider visit in past 12 months      Last office visit with prescriber/PCP: 5/31/2019 America Coronado NP OR same dept: Visit date not found OR same specialty: 5/31/2019 America Coronado NP  Last physical: 9/17/2019 Last MTM visit: Visit date not found   Next visit within 3 mo: Visit date not found  Next physical within 3 mo: Visit date not found  Prescriber OR PCP: America Coronado NP  Last diagnosis associated with med order: 1. Hyperlipemia  - lovastatin (MEVACOR) 20 MG tablet; Take 1 tablet (20 mg total) by mouth at bedtime.  Dispense: 90 tablet; Refill: 3    If protocol passes may refill for 12 months if within 3 months of last provider visit (or a total of 15 months).

## 2021-06-11 NOTE — TELEPHONE ENCOUNTER
Patient Returning Call  Reason for call:  Return call   Information relayed to patient:  Caller was informed of message below.   Patient has additional questions:  No  If YES, what are your questions/concerns:  no  Okay to leave a detailed message?: No

## 2021-06-11 NOTE — TELEPHONE ENCOUNTER
RN cannot approve Refill Request    RN can NOT refill this medication Protocol failed and NO refill given. Last office visit: 5/31/2019 America Coronado NP Last Physical: 9/17/2019 Last MTM visit: Visit date not found Last visit same specialty: Visit date not found.  Next visit within 3 mo: Visit date not found  Next physical within 3 mo: Visit date not found      Freya Nava, [facility] Triage/Med Refill 9/12/2020    Requested Prescriptions   Pending Prescriptions Disp Refills     calcium carbonate (OS-ELOISE) 600 mg calcium (1,500 mg) tablet 180 tablet 3     Sig: Take 1 tablet (600 mg total) by mouth 2 (two) times a day with meals.       There is no refill protocol information for this order        levothyroxine (SYNTHROID, LEVOTHROID) 112 MCG tablet 90 tablet 3     Sig: TAKE 1 TABLET BY MOUTH EVERY DAY AT 6AM       Thyroid Hormones Protocol Passed - 9/12/2020  3:46 AM        Passed - Provider visit in past 12 months or next 3 months     Last office visit with prescriber/PCP: 5/31/2019 America Coronado NP OR same dept: Visit date not found OR same specialty: Visit date not found  Last physical: 9/17/2019 Last MTM visit: Visit date not found   Next visit within 3 mo: Visit date not found  Next physical within 3 mo: Visit date not found  Prescriber OR PCP: America Coronado NP  Last diagnosis associated with med order: 1. Osteopenia, unspecified location  - calcium carbonate (OS-ELOISE) 600 mg calcium (1,500 mg) tablet; Take 1 tablet (600 mg total) by mouth 2 (two) times a day with meals.  Dispense: 180 tablet; Refill: 3    2. Hypothyroidism  - levothyroxine (SYNTHROID, LEVOTHROID) 112 MCG tablet; TAKE 1 TABLET BY MOUTH EVERY DAY AT 6AM  Dispense: 90 tablet; Refill: 3    If protocol passes may refill for 12 months if within 3 months of last provider visit (or a total of 15 months).             Passed - TSH on file in past 12 months for patient age 12 & older     TSH   Date Value Ref Range Status    09/17/2019 0.37 0.30 - 5.00 uIU/mL Final

## 2021-06-11 NOTE — TELEPHONE ENCOUNTER
I only sent her 30 tablets of Synthroid, she is overdue for labs and an office visit. Please get her scheduled.

## 2021-06-12 NOTE — PROGRESS NOTES
Assessment and Plan:     1. Encounter for annual wellness exam in Medicare patient  Basic Metabolic Panel    Hemoglobin   2. Hyperlipidemia, unspecified hyperlipidemia type  Lipid [place]   3. Hypothyroidism, unspecified type  Thyroid Stimulating Hormone (TSH)   4. Anxiety  escitalopram oxalate (LEXAPRO) 10 MG tablet    PHQ9 Depression Screen   5. Reactive depression  escitalopram oxalate (LEXAPRO) 10 MG tablet    PHQ9 Depression Screen   6. Vitamin D deficiency  Vitamin D, Total (25-Hydroxy)   7. Osteopenia  Vitamin D, Total (25-Hydroxy)   8. Impacted cerumen of right ear  Ear Cerumen Removal-Clinic   9. Visit for screening mammogram  Mammo Screening Bilateral   10. Need for vaccination       PHQ-9: 10  LOS-7: 12    Patient has been advised of split billing requirements and indicates understanding: Yes  1. Encounter for annual wellness exam in Medicare patient    - Basic Metabolic Panel  - Hemoglobin    2. Hyperlipidemia, unspecified hyperlipidemia type    - Lipid [place]  The 10-year ASCVD risk score (Yesenia MERCHANT Jr., et al., 2013) is: 10.9%    Values used to calculate the score:      Age: 70 years      Sex: Female      Is Non- : No      Diabetic: No      Tobacco smoker: No      Systolic Blood Pressure: 140 mmHg      Is BP treated: No      HDL Cholesterol: 66 mg/dL      Total Cholesterol: 288 mg/dL  Patient notified for dose adjustment through My Chart    3. Hypothyroidism, unspecified type    - Thyroid Stimulating Hormone (TSH)  No dose adjustment needed, refill sent    4. Anxiety    - escitalopram oxalate (LEXAPRO) 10 MG tablet; Take 1 tablet (10 mg total) by mouth daily.  Dispense: 30 tablet; Refill: 0  - PHQ9 Depression Screen    5. Reactive depression    - escitalopram oxalate (LEXAPRO) 10 MG tablet; Take 1 tablet (10 mg total) by mouth daily.  Dispense: 30 tablet; Refill: 0  - PHQ9 Depression Screen  Depression action plan updated  Encouraged her to schedule some lunch dates with  friends via Zoom, coffee dates sitting in vehicles over 6 feet apart, she is feeling very secluded during COVID  Get outside and take some walks while weather is still nice    6. Vitamin D deficiency    - Vitamin D, Total (25-Hydroxy)    7. Osteopenia    - Vitamin D, Total (25-Hydroxy)  Up to date with bone scan    8. Impacted cerumen of right ear    - Ear Cerumen Removal-Clinic  Ear flushed in clinic, tolerated procedure well    9. Visit for screening mammogram    - Mammo Screening Bilateral; Future  Encouraged monthly BSE    10. Need for vaccination          The patient's current medical problems were reviewed.    The following are part of a depression follow up plan for the patient:  coping support assessment and emotional support assessment  The following high BMI interventions were performed this visit: encouragement to exercise and weight monitoring  The following health maintenance schedule was reviewed with the patient and provided in printed form in the after visit summary:   Health Maintenance   Topic Date Due     MAMMOGRAM  09/27/2021     DXA SCAN  09/27/2021     MEDICARE ANNUAL WELLNESS VISIT  11/06/2021     FALL RISK ASSESSMENT  11/06/2021     LIPID  09/17/2024     ADVANCE CARE PLANNING  11/06/2025     COLORECTAL CANCER SCREENING  01/15/2026     TD 18+ HE  09/07/2028     DEPRESSION ACTION PLAN  Completed     HEPATITIS C SCREENING  Completed     Pneumococcal Vaccine: 65+ Years  Completed     INFLUENZA VACCINE RULE BASED  Completed     ZOSTER VACCINES  Completed     Pneumococcal Vaccine: Pediatrics (0 to 5 Years) and At-Risk Patients (6 to 64 Years)  Aged Out     HEPATITIS B VACCINES  Aged Out        Subjective:   Chief Complaint: Sarah Beth Aceves is an 70 y.o. female here for an Annual Wellness, med check, anxiety.   HPI: Patient rates her overall physical health is good.  She does not exercise regularly.  She has gained 11 pounds since her last annual physical.  She has been drinking more alcohol  recently and her sleep has been rather poor due to the Covid pandemic.  She used to get out and play pickle ball, go lunches and travel prior to Covid.  She is drinking about 1-2 alcoholic beverages per day.  She does not eat 4 servings of fruits and vegetables daily, she recently got new smoke detectors in the home and has carbon monoxide detectors as well.  She is independent with all activities of daily living, she has no concerns for hearing loss or urinary leakage.  She rates her mental and emotional health as fair.  She states most of her anxiety and depression are directly related to Covid and feeling that she is confined to her home.  She is scared to go out in public and do much so she does have her groceries delivered.  She also misses spending time with her grandchildren.  Current coping strategies include Netflix and reading.  She did see a therapist in the past after her divorce but is not currently.  She denies any suicidal ideation today.  She does have a living will on file, no falls in the last 12 months, she does not see any specialist.  She is due for her mammogram.    She does have a history of wax impaction and would like her ears checked today, she also has a history of vitamin D deficiency.    Hypothyroid: She is taking medication daily for this, she has no new symptoms to report.    Hyperlipidemia: She is taking a statin daily, she denies any joint or muscle aches, no urinary changes.  No history of coronary artery disease or previous stroke.  She is a non-smoker.    Review of Systems:    Please see above.  The rest of the review of systems are negative for all systems.    Patient Care Team:  America Coronado NP as PCP - General (Nurse Practitioner)  Jake Ball MD as Assigned PCP     Patient Active Problem List   Diagnosis     Hypothyroidism     Hyperlipemia     Osteopenia     Vitamin D Deficiency     Skin Neoplasm Of Uncertain Behavior     Menopause Has Occurred     Reactive  depression     Anxiety     Past Medical History:   Diagnosis Date     Asymptomatic postmenopausal status (age-related) (natural)     age 56     Cervical high risk human papillomavirus (HPV) DNA test positive 2006    ASCUS Pap, favor benign, HRHPV positive, 2006     History of blepharoplasty 2006    Integumentary Prodecures Blepharoplasty     History of pregnancy     [id number]     Left radial fracture 2018    evaluated by orthopedic     Raynaud's Phenomenon     Created by Conversion       Past Surgical History:   Procedure Laterality Date     BREAST BIOPSY  08    Description: Biopsy Breast Percutaneous Needle Core;  Proc Date: 2008; 2 areas on the same day.     TUBAL LIGATION        Family History   Problem Relation Age of Onset     Other Mother         Acute bowel Ischemia/Infarction;  2009     Hypertension Mother      Coronary artery disease Mother         Mother had stent age 73     Osteoarthritis Mother         of the Hip; S/P hip replacement     Osteoporosis Mother         severe     Thyroid disease Mother      Aneurysm Father         Aneurysm of the Aortic Arch; Grafted X 2;  age 82     Coronary artery disease Father         S/P angioplasty     Emphysema Father      Uterine cancer Sister 58        Uterine Papillary serous carcinoma, diagnosed age 58, Stage IV. Diagnosed by abnormal Pap.     Osteoporosis Sister         premature menopause in her 30's     Thyroid disease Sister       Social History     Socioeconomic History     Marital status:      Spouse name: Not on file     Number of children: 2     Years of education: 13     Highest education level: Not on file   Occupational History     Occupation: retired     Employer: [employer]      Comment:    Social Needs     Financial resource strain: Not on file     Food insecurity     Worry: Not on file     Inability: Not on file     Transportation needs     Medical: Not on file      Non-medical: Not on file   Tobacco Use     Smoking status: Former Smoker     Quit date: 1/1/2005     Years since quitting: 15.8     Smokeless tobacco: Never Used     Tobacco comment: No exposure   Substance and Sexual Activity     Alcohol use: Yes     Alcohol/week: 2.5 standard drinks     Types: 3 Standard drinks or equivalent per week     Drug use: No     Sexual activity: Not on file   Lifestyle     Physical activity     Days per week: Not on file     Minutes per session: Not on file     Stress: Not on file   Relationships     Social connections     Talks on phone: Not on file     Gets together: Not on file     Attends Shinto service: Not on file     Active member of club or organization: Not on file     Attends meetings of clubs or organizations: Not on file     Relationship status: Not on file     Intimate partner violence     Fear of current or ex partner: Not on file     Emotionally abused: Not on file     Physically abused: Not on file     Forced sexual activity: Not on file   Other Topics Concern     Not on file   Social History Narrative        Two children          Current Outpatient Medications   Medication Sig Dispense Refill     calcium carbonate (OS-ELOISE) 600 mg calcium (1,500 mg) tablet Take 1 tablet (600 mg total) by mouth 2 (two) times a day with meals. 180 tablet 3     latanoprost (XALATAN) 0.005 % ophthalmic solution        levothyroxine (SYNTHROID, LEVOTHROID) 112 MCG tablet TAKE 1 TABLET BY MOUTH EVERY DAY AT 6AM. NEEDS TO BE SEEN 15 tablet 0     lovastatin (MEVACOR) 20 MG tablet Take 1 tablet (20 mg total) by mouth at bedtime. 90 tablet 1     timolol maleate (TIMOPTIC) 0.5 % ophthalmic solution 1 drop daily in each eye 10 mL 12     escitalopram oxalate (LEXAPRO) 10 MG tablet Take 1 tablet (10 mg total) by mouth daily. 30 tablet 0     No current facility-administered medications for this visit.       Objective:   Vital Signs:   Visit Vitals  /68   Pulse 68   Temp 98  F (36.7  C)  "(Oral)   Resp 16   Ht 5' 2\" (1.575 m)   Wt 175 lb (79.4 kg)   SpO2 98%   BMI 32.01 kg/m           VisionScreening:  No exam data present     PHYSICAL EXAM    Review of Systems     Denies fever, chills, visual changes, fatigue, myalgias, nasal congestion, rhinorrhea, ear pain, or discharge, sore throat, swollen glands, breast mass, nipple discharge, breast changes, abdominal pain, cough, shortness of breath, chest pain,  change in bowel habits, melena, rectal bleeding, dysuria, frequency, urgency, hematuria, polyuria, polydipsia, polyphagia, joint pain or swelling or erythema, edema, rash, weakness, paresthesias, vaginal discharge or bleeding        Objective:         /68   Pulse 68   Temp 98  F (36.7  C) (Oral)   Resp 16   Ht 5' 2\" (1.575 m)   Wt 175 lb (79.4 kg)   SpO2 98%   BMI 32.01 kg/m       Physical Exam:  General Appearance: Alert, cooperative, no distress, appears stated age  Head: Normocephalic, without obvious abnormality, atraumatic  Eyes: PERRL, conjunctiva/corneas clear, EOM's intact  Ears: Normal TM on left, unable to view right TM due to wax impaction, normal external ear canals, both ears. Post wash: right TM normal, no perforation present  Nose: Nares normal, septum midline,mucosa normal, no drainage  Throat: Lips, mucosa, and tongue normal; teeth and gums normal  Neck: Supple, symmetrical, trachea midline, no adenopathy;  thyroid: not enlarged, symmetric, no tenderness/mass/nodules; no carotid bruit or JVD  Back: Symmetric, no curvature, ROM normal, no CVA tenderness  Lungs: Clear to auscultation bilaterally, respirations unlabored  Heart: Regular rate and rhythm, S1 and S2 normal, no murmur, rub, or gallop, no LE swelling, pulses intact  Abdomen: Soft, non-tender, bowel sounds active all four quadrants,  no masses, no organomegaly  Extremities: Extremities normal, atraumatic, no cyanosis or edema  Skin: Skin color, texture, turgor normal, no rashes or lesions  Lymph nodes: Cervical, " supraclavicular, and axillary nodes normal  Neurologic: Normal, no gross deficits  Psych: tearful, appears overwhelmed, sad, good eye contact, engaged in conversation, memory recall intact             Assessment Results 11/6/2020   Activities of Daily Living No help needed   Instrumental Activities of Daily Living No help needed   Get Up and Go Score -   Mini Cog Total Score 5   Some recent data might be hidden     A Mini-Cog score of 0-2 suggests the possibility of dementia, score of 3-5 suggests no dementia      Identified Health Risks:     The patient was counseled and encouraged to consider modifying their diet and eating habits. She was provided with information on recommended healthy diet options.  She is at risk for falling and has been provided with information to reduce the risk of falling at home.  Patient's advanced directive was discussed and I am comfortable with the patient's wishes.

## 2021-06-12 NOTE — PROGRESS NOTES
1. Encounter to establish care     2. Hypothyroidism, unspecified type  Thyroid Stimulating Hormone (TSH)    T4, Total   3. Hyperlipidemia, unspecified hyperlipidemia type  Lipid [place] FASTING    Comprehensive Metabolic Panel   4. Flu vaccine need  Influenza High Dose, Seasonal 65+ yrs   5. Pneumococcal vaccination given  Pneumococcal conjugate vaccine 13-valent 6wks-17yrs; >50yrs   6. Visit for screening mammogram  Mammo Screening Bilateral   7. Osteopenia  DXA Bone Density Scan   8. Overweight (BMI 25.0-29.9)           ASSESSMENT/PLAN:     The following high BMI interventions were performed this visit: encouragement to exercise and weight monitoring    1. Encounter to establish care    -get to know you visit    2. Hypothyroidism, unspecified type    - Thyroid Stimulating Hormone (TSH)  - T4, Total    3. Hyperlipidemia, unspecified hyperlipidemia type    - Lipid [place] FASTING  - Comprehensive Metabolic Panel    4. Flu vaccine need    - Influenza High Dose, Seasonal 65+ yrs    5. Pneumococcal vaccination given    - Pneumococcal conjugate vaccine 13-valent 6wks-17yrs; >50yrs    6. Visit for screening mammogram    - Mammo Screening Bilateral; Future    7. Osteopenia    - DXA Bone Density Scan; Future    8. Overweight    -discussed, has access [facility]      Follow up in 6 months for next thyroid check. Will notify patient of any dose changes based on lab work results today.     The visit lasted a total of 30 minutes face to face with the patient.  Over 50% of the time spent counseling and educating the patient about the above content.      America Coronado NP          SUBJECTIVE:  Sarah Beth Aceves is a 66 y.o. female presents for medication check and lab work.  Recently retired on March 31 from [employer].  She is adjusting to being retired.  I did review her medical history, family and surgical history and current medication list today.  She does have a diagnosis of hypothyroidism so  we will go ahead and check her thyroid level today.  She is also due for her lipid check as she does take 10 mg of Mevacor daily.  Patient is fasting today.  She she is overdue for a bone scan and is taking the adequate supplementation such as vitamin D and calcium.  Will go ahead and order a bone scan for comparison, last can was in 2014.  She is also due for a mammogram which we also discussed today.  She is up-to-date with her colonoscopy, last performed 2016.  She has agreed to have her flu and pneumonia vaccines today.  Overall, patient has been feeling well, no concerns regarding fatigue or cold intolerance.  She denies having any pain anywhere today.  Chief Complaint   Patient presents with     Establish Care     Labs for lipids and cholesterol.          Patient Active Problem List   Diagnosis     Raynaud's Phenomenon     Hypothyroidism     Hyperlipidemia     Osteopenia     Vitamin D Deficiency     Skin Neoplasm Of Uncertain Behavior     Menopause Has Occurred       Current Outpatient Prescriptions   Medication Sig Dispense Refill     calcium-vitamin D (CALCIUM-VITAMIN D) 500 mg(1,250mg) -200 unit per tablet Take 1 tablet by mouth 2 (two) times a day with meals.       cholecalciferol, vitamin D3, (CHOLECALCIFEROL) 1,000 unit tablet Take 1,000 Units by mouth daily.       levothyroxine (SYNTHROID, LEVOTHROID) 112 MCG tablet Take 1 tablet (112 mcg total) by mouth Daily at 6:00 am. 90 tablet 0     lovastatin (MEVACOR) 10 MG tablet Take 1 tablet (10 mg total) by mouth at bedtime. 5 tablet 0     multivitamin (ONE A DAY) per tablet Take 1 tablet by mouth daily.       No current facility-administered medications for this visit.        History   Smoking Status     Former Smoker     Quit date: 1/1/2005   Smokeless Tobacco     Never Used     Comment: No exposure       REVIEW OF SYSTEMS: Denies fatigue, myalgias, weakness or hematuria.      TOBACCO USE:  History   Smoking Status     Former Smoker     Quit date: 1/1/2005    Smokeless Tobacco     Never Used     Comment: No exposure     Social History     Social History     Marital status:      Spouse name: N/A     Number of children: 2     Years of education: 13     Occupational History     retired [employer]           Social History Main Topics     Smoking status: Former Smoker     Quit date: 1/1/2005     Smokeless tobacco: Never Used      Comment: No exposure     Alcohol use 1.5 oz/week     3 Standard drinks or equivalent per week     Drug use: No     Sexual activity: Not on file     Other Topics Concern     Not on file     Social History Narrative        Two children                 OBJECTIVE:            Vitals:    09/07/17 0901   BP: 126/60   Pulse: 72   Resp: 16     Weight: 165 lb 14.4 oz (75.3 kg)  Wt Readings from Last 3 Encounters:   09/07/17 165 lb 14.4 oz (75.3 kg)   01/19/17 160 lb (72.6 kg)   04/20/16 149 lb (67.6 kg)     Body mass index is 29.86 kg/(m^2).        Physical Exam:  GENERAL APPEARANCE: A&A, NAD, well hydrated, well nourished  SKIN:  Normal skin turgor, no lesions/rashes   NECK: Supple, without lymphadenopathy, no thyroid mass, no JVD, no bruit  CV: RRR, no M/G/R   LUNGS: CTAB, normal respiratory effort  ABDOMEN: S&NT, no masses, no organomegaly, BS present x4   EXTREMITY: no edema

## 2021-06-12 NOTE — TELEPHONE ENCOUNTER
She is overdue for med check, last med check was 9/2019. She is due for lab work. Please schedule. I sent 15 tablets

## 2021-06-12 NOTE — TELEPHONE ENCOUNTER
RN cannot approve Refill Request    RN can NOT refill this medication Protocol failed and NO refill given. Last office visit: 5/31/2019 America Coronado NP Last Physical: 9/17/2019 Last MTM visit: Visit date not found Last visit same specialty: Visit date not found.  Next visit within 3 mo: Visit date not found  Next physical within 3 mo: Visit date not found      Freya Nava, [facility] Triage/Med Refill 10/25/2020    Requested Prescriptions   Pending Prescriptions Disp Refills     levothyroxine (SYNTHROID, LEVOTHROID) 112 MCG tablet [Pharmacy Med Name: LEVOTHYROXINE 112 MCG TABLET] 15 tablet 0     Sig: TAKE 1 TABLET BY MOUTH EVERY DAY AT 6AM. NEEDS TO BE SEEN       Thyroid Hormones Protocol Failed - 10/25/2020 10:32 AM        Failed - TSH on file in past 12 months for patient age 12 & older     TSH   Date Value Ref Range Status   09/17/2019 0.37 0.30 - 5.00 uIU/mL Final                   Passed - Provider visit in past 12 months or next 3 months     Last office visit with prescriber/PCP: 5/31/2019 America Coronado NP OR same dept: Visit date not found OR same specialty: Visit date not found  Last physical: 9/17/2019 Last MTM visit: Visit date not found   Next visit within 3 mo: Visit date not found  Next physical within 3 mo: Visit date not found  Prescriber OR PCP: America Coronado NP  Last diagnosis associated with med order: 1. Hypothyroidism  - levothyroxine (SYNTHROID, LEVOTHROID) 112 MCG tablet [Pharmacy Med Name: LEVOTHYROXINE 112 MCG TABLET]; TAKE 1 TABLET BY MOUTH EVERY DAY AT 6AM. NEEDS TO BE SEEN  Dispense: 15 tablet; Refill: 0    If protocol passes may refill for 12 months if within 3 months of last provider visit (or a total of 15 months).

## 2021-06-12 NOTE — TELEPHONE ENCOUNTER
RN cannot approve Refill Request    RN can NOT refill this medication PCP messaged that patient is overdue for Labs. Last office visit: 5/31/2019 America Coronado NP Last Physical: 11/6/2020 Last MTM visit: Visit date not found Last visit same specialty: Visit date not found.  Next visit within 3 mo: Visit date not found  Next physical within 3 mo: Visit date not found      Freya Nava, [facility] Triage/Med Refill 11/7/2020    Requested Prescriptions   Pending Prescriptions Disp Refills     levothyroxine (SYNTHROID, LEVOTHROID) 112 MCG tablet [Pharmacy Med Name: LEVOTHYROXINE 112 MCG TABLET] 15 tablet 0     Sig: TAKE 1 TABLET BY MOUTH EVERY DAY AT 6AM. NEEDS TO BE SEEN       Thyroid Hormones Protocol Failed - 11/6/2020  1:30 PM        Failed - TSH on file in past 12 months for patient age 12 & older     TSH   Date Value Ref Range Status   11/06/2020 2.85 0.30 - 5.00 uIU/mL Final                   Passed - Provider visit in past 12 months or next 3 months     Last office visit with prescriber/PCP: 5/31/2019 America Coronado NP OR same dept: Visit date not found OR same specialty: Visit date not found  Last physical: 11/6/2020 Last MTM visit: Visit date not found   Next visit within 3 mo: Visit date not found  Next physical within 3 mo: Visit date not found  Prescriber OR PCP: America Coronado NP  Last diagnosis associated with med order: 1. Hypothyroidism  - levothyroxine (SYNTHROID, LEVOTHROID) 112 MCG tablet [Pharmacy Med Name: LEVOTHYROXINE 112 MCG TABLET]; TAKE 1 TABLET BY MOUTH EVERY DAY AT 6AM. NEEDS TO BE SEEN  Dispense: 15 tablet; Refill: 0    If protocol passes may refill for 12 months if within 3 months of last provider visit (or a total of 15 months).

## 2021-06-13 NOTE — PROGRESS NOTES
"Sarah Beth Aceves is a 70 y.o. female who is being evaluated via a billable video visit.      The patient has been notified of following:     \"This video visit will be conducted via a call between you and your physician/provider. We have found that certain health care needs can be provided without the need for an in-person physical exam.  This service lets us provide the care you need with a video conversation.  If a prescription is necessary we can send it directly to your pharmacy.  If lab work is needed we can place an order for that and you can then stop by our lab to have the test done at a later time.    Video visits are billed at different rates depending on your insurance coverage. Please reach out to your insurance provider with any questions.    If during the course of the call the physician/provider feels a video visit is not appropriate, you will not be charged for this service.\"    Patient has given verbal consent to a Video visit? Yes  How would you like to obtain your AVS? AVS Preference: WooMe.  If dropped by the video visit, the video invitation should be sent to: Text to cell phone: [phone]- hj logged on via daysoft   Will anyone else be joining your video visit? No        Video Start Time: 9:54 AM    Additional provider notes: Anxiety and depression follow up. Doing well on 10 mg Lexapro dose, developed nausea the first week but has resolved. Feeling less anxious and no longer having crying spells. No panic attacks. Denies suicidal thoughts. Granddaughter recently exposed to COVID at school so she worries about that and when she can be around her again. She is having issues with insomnia, she is not taking anything OTC for this. She goes to bed around 9 but tosses and turns most of the night. She is not seeing a therapist.       GENERAL: Healthy, alert and no distress  EYES: Eyes grossly normal to inspection. No discharge or erythema, or obvious scleral/conjunctival abnormalities.  RESP: No " audible wheeze, cough, or visible cyanosis.  No visible retractions or increased work of breathing.    NEURO: Cranial nerves grossly intact. Mentation and speech appropriate for age.  PSYCH: Mentation appears normal, affect normal/bright, judgement and insight intact, normal speech and appearance well-groomed    1. Anxiety  PHQ9 Depression Screen    escitalopram oxalate (LEXAPRO) 10 MG tablet   2. Reactive depression  PHQ9 Depression Screen    escitalopram oxalate (LEXAPRO) 10 MG tablet   3. Insomnia, unspecified type       PHQ 2020   PHQ-9 Total Score 4   Q9: Thoughts of better off dead/self-harm past 2 weeks Not at all       LOS-7 Screening Results:  How difficult did these problems make it for you to do your work, take care of things at home or get along with other people? : Not difficult at all (2020  9:00 AM)  Feeling nervous, anxious, or on edge: 1 (2020  9:00 AM)  Not being able to stop or control worryin (2020  9:00 AM)  Worrying too much about different things: 1 (2020  9:00 AM)  Trouble relaxin (2020  9:00 AM)  Being so restless that it's hard to sit still: 0 (2020  9:00 AM)  Becoming easily annoyed or irritable: 0 (2020  9:00 AM)  Feeling afraid as if something awful might happen: 2 (2020  9:00 AM)  LOS 7 Total Score: 6 (2020  9:00 AM)  How difficult did these problems make it for you to do your work, take care of things at home or get along with other people? : Not difficult at all (2020  9:00 AM)    PLAN:    Continue with Lexapro 10 mg daily  Recommend Melatonin 3 mg HS PRN for insomnia issues, stick to regular sleep schedule, limit caffeine/water intake 3 hours before bed, limit electronic use 3 hours before bed  Follow up in 3 to 6 months for anxiety and depression follow up      Video-Visit Details    Type of service:  Video Visit    Video End Time (time video stopped): 1008  Originating Location (pt. Location): Home    Distant  Location (provider location):  [facility]     Platform used for Video Visit: Raymon Coronado NP

## 2021-06-16 PROBLEM — F41.9 ANXIETY: Status: ACTIVE | Noted: 2020-11-06

## 2021-06-16 PROBLEM — G47.00 INSOMNIA, UNSPECIFIED TYPE: Status: ACTIVE | Noted: 2020-11-24

## 2021-06-16 PROBLEM — F32.9 REACTIVE DEPRESSION: Status: ACTIVE | Noted: 2020-11-06

## 2021-06-17 NOTE — TELEPHONE ENCOUNTER
Refill Approved    Rx renewed per Medication Renewal Policy. Medication was last renewed on 11/24/20.    Lorena Drake, [facility] Triage/Med Refill 5/20/2021     Requested Prescriptions   Pending Prescriptions Disp Refills     escitalopram oxalate (LEXAPRO) 10 MG tablet [Pharmacy Med Name: ESCITALOPRAM 10 MG TABLET] 90 tablet 1     Sig: TAKE 1 TABLET BY MOUTH EVERY DAY       [facility] Refill Protocol  Passed - 5/19/2021 12:21 AM        Passed - PCP or prescribing provider visit in last year     Last office visit with prescriber/PCP: 5/31/2019 America Coronado NP OR same dept: Visit date not found OR same specialty: 5/31/2019 America Coronado NP  Last physical: 11/6/2020 Last MTM visit: Visit date not found   Next visit within 3 mo: Visit date not found  Next physical within 3 mo: Visit date not found  Prescriber OR PCP: America Coronado NP  Last diagnosis associated with med order: 1. Anxiety  - escitalopram oxalate (LEXAPRO) 10 MG tablet [Pharmacy Med Name: ESCITALOPRAM 10 MG TABLET]; TAKE 1 TABLET BY MOUTH EVERY DAY  Dispense: 90 tablet; Refill: 1    2. Reactive depression  - escitalopram oxalate (LEXAPRO) 10 MG tablet [Pharmacy Med Name: ESCITALOPRAM 10 MG TABLET]; TAKE 1 TABLET BY MOUTH EVERY DAY  Dispense: 90 tablet; Refill: 1    If protocol passes may refill for 12 months if within 3 months of last provider visit (or a total of 15 months).

## 2021-06-17 NOTE — TELEPHONE ENCOUNTER
Refill Approved    Rx renewed per Medication Renewal Policy. Medication was last renewed on 11/8/20.    Romeo Rodney, [facility] Triage/Med Refill 5/5/2021     Requested Prescriptions   Pending Prescriptions Disp Refills     lovastatin (MEVACOR) 40 MG tablet [Pharmacy Med Name: LOVASTATIN 40 MG TABLET] 90 tablet 1     Sig: TAKE 1 TABLET BY MOUTH EVERY DAY       Statins Refill Protocol (Hmg CoA Reductase Inhibitors) Passed - 5/5/2021  1:04 AM        Passed - PCP or prescribing provider visit in past 12 months      Last office visit with prescriber/PCP: 5/31/2019 America Coronado NP OR same dept: Visit date not found OR same specialty: 5/31/2019 America Coronado NP  Last physical: 11/6/2020 Last MTM visit: Visit date not found   Next visit within 3 mo: Visit date not found  Next physical within 3 mo: Visit date not found  Prescriber OR PCP: America Coronado NP  Last diagnosis associated with med order: 1. Hyperlipidemia, unspecified hyperlipidemia type  - lovastatin (MEVACOR) 40 MG tablet [Pharmacy Med Name: LOVASTATIN 40 MG TABLET]; TAKE 1 TABLET BY MOUTH EVERY DAY  Dispense: 90 tablet; Refill: 1    If protocol passes may refill for 12 months if within 3 months of last provider visit (or a total of 15 months).

## 2021-06-17 NOTE — PATIENT INSTRUCTIONS - HE
Patient Instructions by America Coronado NP at 9/17/2019  9:20 AM     Author: America Coronado NP Service: -- Author Type: Nurse Practitioner    Filed: 9/17/2019  9:13 AM Encounter Date: 9/17/2019 Status: Signed    : America Coronado NP (Nurse Practitioner)         Patient Education   Preventing Falls in the Home  As you get older, falls are more likely. Thats because your reaction time slows. Your muscles and joints may also get stiffer, making them less flexible. Illness, medications, and vision changes can also affect your balance. A fall could leave you unable to live on your own. To make your home safer, follow these tips:    Floors    Put nonskid pads under area rugs.    Remove throw rugs.    Replace worn floor coverings.    Tack carpets firmly to each step on carpeted stairs. Put nonskid strips on the edges of uncarpeted stairs.    Keep floors and stairs free of clutter and cords.    Arrange furniture so there are clear pathways.    Clean up any spills right away.    Bathrooms    Install grab bars in the tub or shower.    Apply nonskid strips or put a nonskid rubber mat in the tub or shower.    Sit on a bath chair to bathe.    Use bathmats with nonskid backing.    Lighting    Keep a flashlight in each room.    Put a nightlight along the pathway between the bedroom and the bathroom.    9030-7278 [employer]. [address] [place] [address]. All rights reserved. This information is not intended as a substitute for professional medical care. Always follow your healthcare professional's instructions.           Advance Directive  Patients advance directive was discussed and I am comfortable with the patients wishes.  Patient Education   Personalized Prevention Plan  You are due for the preventive services outlined below.  Your care team is available to assist you in scheduling these services.  If you have already completed any of these items, please share that information  with your care team to update in your medical record.  Health Maintenance   Topic Date Due   ? HEPATITIS C SCREENING  1950   ? ZOSTER VACCINES (2 of 3) 06/06/2013   ? INFLUENZA VACCINE RULE BASED (1) 08/01/2019   ? FALL RISK ASSESSMENT  09/07/2019   ? MEDICARE ANNUAL WELLNESS VISIT  09/07/2019   ? DXA SCAN  09/21/2019   ? MAMMOGRAM  09/25/2020   ? ADVANCE CARE PLANNING  09/07/2023   ? COLONOSCOPY  01/15/2026   ? TD 18+ HE  09/07/2028   ? PNEUMOCOCCAL POLYSACCHARIDE VACCINE AGE 65 AND OVER  Completed   ? PNEUMOCOCCAL CONJUGATE VACCINE FOR ADULTS (PCV13 OR PREVNAR)  Completed

## 2021-06-18 NOTE — PATIENT INSTRUCTIONS - HE
Patient Instructions by America Coronado NP at 11/6/2020  9:00 AM     Author: America Coronado NP Service: -- Author Type: Nurse Practitioner    Filed: 11/6/2020  8:58 AM Encounter Date: 11/6/2020 Status: Signed    : America Coronado NP (Nurse Practitioner)         Patient Education   Understanding USDA MyPlate  The USDA (US Department of Agriculture) has guidelines to help you make healthy food choices. These are called MyPlate. MyPlate shows the food groups that make up healthy meals using the image of a place setting. Before you eat, think about the healthiest choices for what to put onto your plate or into your cup or bowl. To learn more about building a healthy plate, visit www.choosemyplate.gov.       The Food Groups    Fruits: Any fruit or 100% fruit juice counts as part of the Fruit Group. Fruits may be fresh, canned, frozen, or dried, and may be whole, cut-up, or pureed. Make half your plate fruits and vegetables.    Vegetables: Any vegetable or 100% vegetable juice counts as a member of the Vegetable Group. Vegetables may be fresh, frozen, canned, or dried. They can be served raw or cooked and may be whole, cut-up, or mashed. Make half your plate fruits and vegetables.     Grains: All foods made from grains are part of the Grains Group. These include wheat, rice, oats, cornmeal, and barley such as bread, pasta, oatmeal, cereal, tortillas, and grits. Grains should be no more than a quarter of your plate. At least half of your grains should be whole grains.    Protein: This group includes meat, poultry, seafood, beans and peas, eggs, processed soy products (like tofu), nuts (including nut butters), and seeds. Make protein choices no more than a quarter of your plate. Meat and poultry choices should be lean or low fat.    Dairy: All fluid milk products and foods made from milk that contain calcium, like yogurt and cheese are part of the Dairy Group. (Foods that have little calcium, such as  cream, butter, and cream cheese, are not part of the group.) Most dairy choices should be low-fat or fat-free.    Oils: These are fats that are liquid at room temperature. They include canola, corn, olive, soybean, and sunflower oil. Foods that are mainly oil include mayonnaise, certain salad dressings, and soft margarines. You should have only 5 to 7 teaspoons of oils a day. You probably already get this much from the food you eat.  Use [employer] to Help Build Your Meals  The [employer] can help you plan and track your meals and activity. You can look up individual foods to see or compare their nutritional value. You can get guidelines for what and how much you should eat. You can compare your food choices. And you can assess personal physical activities and see ways you can improve. Go to www.[employer].gov/[employer]/.    3150-3787 The [facility]. [address] [place] [address]. All rights reserved. This information is not intended as a substitute for professional medical care. Always follow your healthcare professional's instructions.           Patient Education   Preventing Falls in the Home  As you get older, falls are more likely. Thats because your reaction time slows. Your muscles and joints may also get stiffer, making them less flexible. Illness, medications, and vision changes can also affect your balance. A fall could leave you unable to live on your own. To make your home safer, follow these tips:    Floors    Put nonskid pads under area rugs.    Remove throw rugs.    Replace worn floor coverings.    Tack carpets firmly to each step on carpeted stairs. Put nonskid strips on the edges of uncarpeted stairs.    Keep floors and stairs free of clutter and cords.    Arrange furniture so there are clear pathways.    Clean up any spills right away.    Bathrooms    Install grab bars in the tub or shower.    Apply nonskid strips or put a nonskid rubber mat in the tub or  shower.    Sit on a bath chair to bathe.    Use bathmats with nonskid backing.    Lighting    Keep a flashlight in each room.    Put a nightlight along the pathway between the bedroom and the bathroom.    9785-2734 [employer]. [address] [place] [address]. All rights reserved. This information is not intended as a substitute for professional medical care. Always follow your healthcare professional's instructions.           Advance Directive  Patients advance directive was discussed and I am comfortable with the patients wishes.  Patient Education   Personalized Prevention Plan  You are due for the preventive services outlined below.  Your care team is available to assist you in scheduling these services.  If you have already completed any of these items, please share that information with your care team to update in your medical record.  Health Maintenance   Topic Date Due   ? INFLUENZA VACCINE RULE BASED (1) 08/01/2020   ? FALL RISK ASSESSMENT  09/17/2020   ? MAMMOGRAM  09/27/2021   ? DXA SCAN  09/27/2021   ? MEDICARE ANNUAL WELLNESS VISIT  11/06/2021   ? LIPID  09/17/2024   ? ADVANCE CARE PLANNING  11/06/2025   ? COLORECTAL CANCER SCREENING  01/15/2026   ? TD 18+ HE  09/07/2028   ? HEPATITIS C SCREENING  Completed   ? Pneumococcal Vaccine: 65+ Years  Completed   ? ZOSTER VACCINES  Completed   ? Pneumococcal Vaccine: Pediatrics (0 to 5 Years) and At-Risk Patients (6 to 64 Years)  Aged Out   ? HEPATITIS B VACCINES  Aged Out

## 2021-06-20 NOTE — PROGRESS NOTES
Assessment and Plan:     1. Annual physical exam  Comprehensive Metabolic Panel    Hemoglobin   2. Hypothyroidism, unspecified type  Thyroid Stimulating Hormone (TSH)   3. Hyperlipemia  Lipid Cascade RANDOM   4. Vitamin D deficiency  Vitamin D, Total (25-Hydroxy)   5. Fall  XR Wrist Left 2 VWS   6. Left wrist pain  XR Wrist Left 2 VWS   7. Skin tag     8. Need for vaccination  Tdap vaccine,  8yo or older,  IM    Influenza High Dose, Seasonal 65+ yrs    Pneumococcal polysaccharide vaccine 23-valent 3 yo or older, subq/IM   9. Routine general medical examination at a health care facility         1. Annual physical exam    - Comprehensive Metabolic Panel  - Hemoglobin    2. Hypothyroidism, unspecified type    - Thyroid Stimulating Hormone (TSH)    3. Hyperlipemia    - Lipid Cascade RANDOM    4. Vitamin D deficiency    - Vitamin D, Total (25-Hydroxy)    5. Fall    - XR Wrist Left 2 VWS; Future    6. Left wrist pain    - XR Wrist Left 2 VWS; Future    7. Skin tag    -x 9 skin tags removed using surgical forceps and surgical scissors.  Each site was cauterized ×3, no bleeding post procedure, patient tolerated procedure well.    8. Need for vaccination    - Tdap vaccine,  8yo or older,  IM  - Influenza High Dose, Seasonal 65+ yrs  - Pneumococcal polysaccharide vaccine 23-valent 3 yo or older, subq/IM    9. Routine general medical examination at a health care facility          The patient's current medical problems were reviewed.    The following high BMI interventions were performed this visit: encouragement to exercise and weight monitoring  The following health maintenance schedule was reviewed with the patient and provided in printed form in the after visit summary:   Health Maintenance   Topic Date Due     FALL RISK ASSESSMENT  09/07/2019     MAMMOGRAM  09/21/2019     DXA SCAN  09/21/2019     ADVANCE DIRECTIVES DISCUSSED WITH PATIENT  04/20/2021     COLONOSCOPY  01/15/2026     TD 18+ HE  09/07/2028     PNEUMOCOCCAL  POLYSACCHARIDE VACCINE AGE 65 AND OVER  Completed     INFLUENZA VACCINE RULE BASED  Completed     PNEUMOCOCCAL CONJUGATE VACCINE FOR ADULTS (PCV13 OR PREVNAR)  Completed     ZOSTER VACCINE  Completed        Subjective:   Chief Complaint: Sarah Beth Aceves is an 67 y.o. female here for an Annual Wellness visit.  Medical, family and surgical history reviewed.  Medical history includes hyperlipidemia, patient is currently taking a statin at this time.  She is not taking a daily baby aspirin.  She does take Synthroid for her hypothyroidism.  Currently supplementing with calcium and vitamin D for her osteopenia confirmed by her DEXA scan in 2017.  She does have a history of vitamin D deficiency. She is scheduled to repeat her mammogram this year.  She is up-to-date with her colonoscopy.  Denies any abnormal vaginal bleeding or discharge.    Fall: Patient was playing pickle ball 2 weeks ago and stepped backwards and ended up falling.  She did catch herself landed on her wrist rather hard.  She did experience swelling and bruising that was pretty significant.  No deformity noted.  She continues to experience intermittent pain in the left wrist that is dull and achy.  It is aggravated when she twists her wrist around.  She has been wearing a brace on the wrist which has been helping to minimize her discomfort.  She is rating the pain in her wrist today a 2 out of 10.  She does have full sensation, denies numbness and tingling, she is able to  items without difficulty.    Skin tags: Patient has multiple skin takes that she would like removed today.  7 skin takes noted in the area between her breasts, one skin tag on the right posterior neck and one behind the left ear.     Health maintenance: Tdap, pneumococcal 23 and influenza vaccinations administered today.    Vital signs and weight are stable.  No other concerns.  HPI:      Review of Systems:    Please see above.  The rest of the review of systems are negative for  all systems.    Patient Care Team:  America Coronado NP as PCP - General (Nurse Practitioner)     Patient Active Problem List   Diagnosis     Raynaud's Phenomenon     Hypothyroidism     Hyperlipemia     Osteopenia     Vitamin D Deficiency     Skin Neoplasm Of Uncertain Behavior     Menopause Has Occurred     Fall     Left wrist pain     Skin tag     Past Medical History:   Diagnosis Date     Asymptomatic postmenopausal status (age-related) (natural)     age 56     Cervical high risk human papillomavirus (HPV) DNA test positive 2006    ASCUS Pap, favor benign, HRHPV positive, 2006     History of blepharoplasty 2006    Integumentary Prodecures Blepharoplasty     History of pregnancy     NSVDX2      Past Surgical History:   Procedure Laterality Date     BREAST BIOPSY  08    Description: Biopsy Breast Percutaneous Needle Core;  Proc Date: 2008; 2 areas on the same day.     TUBAL LIGATION        Family History   Problem Relation Age of Onset     Other Mother      Acute bowel Ischemia/Infarction;  2009     Hypertension Mother      Coronary artery disease Mother      Mother had stent age 73     Osteoarthritis Mother      of the Hip; S/P hip replacement     Osteoporosis Mother      severe     Thyroid disease Mother      Aneurysm Father      Aneurysm of the Aortic Arch; Grafted X 2;  age 82     Coronary artery disease Father      S/P angioplasty     Emphysema Father      Uterine cancer Sister 58     Uterine Papillary serous carcinoma, diagnosed age 58, Stage IV. Diagnosed by abnormal Pap.     Osteoporosis Sister      premature menopause in her 30's     Thyroid disease Sister       Social History     Social History     Marital status:      Spouse name: N/A     Number of children: 2     Years of education: 13     Occupational History     retired [employer]           Social History Main Topics     Smoking status: Former Smoker     Quit date: 2005      "Smokeless tobacco: Never Used      Comment: No exposure     Alcohol use 1.5 oz/week     3 Standard drinks or equivalent per week     Drug use: No     Sexual activity: Not on file     Other Topics Concern     Not on file     Social History Narrative        Two children              Current Outpatient Prescriptions   Medication Sig Dispense Refill     calcium-vitamin D (CALCIUM-VITAMIN D) 500 mg(1,250mg) -200 unit per tablet Take 1 tablet by mouth 2 (two) times a day with meals.       levothyroxine (SYNTHROID, LEVOTHROID) 112 MCG tablet TAKE 1 TABLET BY MOUTH EVERY DAY AT 6:00 AM. 90 tablet 1     lovastatin (MEVACOR) 20 MG tablet Take 1 tablet (20 mg total) by mouth at bedtime. 90 tablet 1     multivitamin (ONE A DAY) per tablet Take 1 tablet by mouth daily.       No current facility-administered medications for this visit.       Objective:   Vital Signs:   Visit Vitals     /74     Pulse 83     Temp 98.2  F (36.8  C)     Resp 14     Ht 5' 2\" (1.575 m)     Wt 165 lb (74.8 kg)     SpO2 95%     Breastfeeding No     BMI 30.18 kg/m2        VisionScreening:  No exam data present         Review of Systems     Denies fever, chills, visual changes, fatigue, myalgias, nasal congestion, rhinorrhea, ear pain, or discharge, sore throat, swollen glands, breast mass, nipple discharge, breast changes, abdominal pain, cough, shortness of breath, chest pain, weight change, change in bowel habits, melena, rectal bleeding, dysuria, frequency, urgency, hematuria, polyuria, polydipsia, polyphagia,  erythema, edema, rash, weakness, paresthesias, vaginal discharge or bleeding or mood changes.     Positive: left wrist pain from fall, skin tags  Objective:         /74  Pulse 83  Temp 98.2  F (36.8  C)  Resp 14  Ht 5' 2\" (1.575 m)  Wt 165 lb (74.8 kg)  SpO2 95%  Breastfeeding? No  BMI 30.18 kg/m2     Physical Exam:  General Appearance: Alert, cooperative, no distress, appears stated age  Head: Normocephalic, without " obvious abnormality, atraumatic  Eyes: PERRL, conjunctiva/corneas clear, EOM's intact, wearing glasses  Ears: Normal TM's and external ear canals, both ears  Nose: Nares normal, septum midline,mucosa normal, no drainage  Throat: Lips, mucosa, and tongue normal; teeth and gums normal  Neck: Supple, symmetrical, trachea midline, no adenopathy;  thyroid: not enlarged, symmetric, no tenderness/mass/nodules; no carotid bruit or JVD  Back: Symmetric, no curvature, ROM normal, no CVA tenderness  Lungs: Clear to auscultation bilaterally, respirations unlabored  Breasts: No breast masses, tenderness, asymmetry, or nipple discharge. Breast exam carefully reviewed with patient.  Heart: Regular rate and rhythm, S1 and S2 normal, no murmur, rub, or gallop  Abdomen: Soft, non-tender, bowel sounds active all four quadrants,  no masses, no organomegaly  Pelvic:Normally developed genitalia with no external lesions or eruptions. Pelvic exam reveal NO masses, abnormal bleeding or odor.  Extremities: Extremities normal, atraumatic, no cyanosis or edema, bruising on anterior surface of left wrist, limited range of motion and increased pain in left wrist with external rotation, against resistance and with prayer sign.  Skin: Skin color, texture, turgor normal, no rashes or lesions, multiple skin tags between breasts, on right posterior neck and behind left ear  Lymph nodes: Cervical, supraclavicular, and axillary nodes normal  Neurologic: Normal, DTR's intact, equal  strength, no drift               Assessment Results 9/7/2018   Activities of Daily Living No help needed   Instrumental Activities of Daily Living No help needed   Get Up and Go Score Less than 12 seconds   Mini Cog Total Score 4   Some recent data might be hidden     A Mini-Cog score of 0-2 suggests the possibility of dementia, score of 3-5 suggests no dementia    Identified Health Risks:     She is at risk for falling and has been provided with information to reduce  the risk of falling at home.  Patient's advanced directive was discussed and I am comfortable with the patient's wishes. Advised patient to drop off care directive at clinic so can get on file

## 2021-06-21 NOTE — LETTER
Letter by America Coronado NP at      Author: America Coronado NP Service: -- Author Type: --    Filed:  Encounter Date: 11/6/2020 Status: (Other)                    My Depression Action Plan  Name: Sarah Beth Aceves   Date of Birth 1950  Date: 11/6/2020    My Doctor: America Coronado NP   My Clinic: [facility]  [address] [place] [address] S, [facility] [address]  [facility] [address]  [phone]          GREEN    ZONE   Good Control    What it looks like:     Things are going generally well. You have normal ups and downs. You may even feel depressed from time to time, but bad moods usually last less than a day.   What you need to do:  1. Continue to care for yourself (see self care plan)  2. Check your depression survival kit and update it as needed  3. Follow your physicians recommendations including any medication.  4. Do not stop taking medication unless you consult with your physician first.           YELLOW         ZONE Getting Worse    What it looks like:     Depression is starting to interfere with your life.     It may be hard to get out of bed; you may be starting to isolate yourself from others.    Symptoms of depression are starting to last most all day and this has happened for several days.     You may have suicidal thoughts but they are not constant.   What you need to do:     1. Call your care team. Your response to treatment will improve if you keep your care team informed of your progress. Yellow periods are signs an adjustment may need to be made.     2. Continue your self-care.  Just get dressed and ready for the day.  Don't give yourself time to talk yourself out of it.    3. Talk to someone in your support network.    4. Open up your depression Depression Self-Care Plan / Wellness kit.           RED    ZONE Medical Alert - Get Help    What it looks like:     Depression is seriously interfering with your life.     You may experience these or  other symptoms: You cant get out of bed most days, cant work or engage in other necessary activities, you have trouble taking care of basic hygiene, or basic responsibilities, thoughts of suicide or death that will not go away, self-injurious behavior.     What you need to do:  1. Call your care team and request a same-day appointment. If they are not available (weekends or after hours) call your local crisis line, emergency room or 911.            Self-Care Plan / Wellness Kit    Self-Care for Depression  Heres the deal. Your body and mind are really not as separate as most people think.  What you do and think affects how you feel and how you feel influences what you do and think. This means if you do things that people who feel good do, it will help you feel better.  Sometimes this is all it takes.  There is also a place for medication and therapy depending on how severe your depression is, so be sure to consult with your medical provider and/ or Behavioral Health Consultant if your symptoms are worsening or not improving.     In order to better manage my stress, I will:    Exercise  Get some form of exercise, every day. This will help reduce pain and release endorphins, the feel good chemicals in your brain. This is almost as good as taking antidepressants!  This is not the same as joining a gym and then never going! (they count on that by the way?) It can be as simple as just going for a walk or doing some gardening, anything that will get you moving.      Hygiene   Maintain good hygiene (get out of bed in the morning, make your bed, brush your teeth, take a shower, and get dressed like you were going to work, even if you are unemployed).  If your clothes don't fit try to get ones that do.    Diet  Strive to eat foods that are good for me, drink plenty of water, and avoid excessive sugar, caffeine, alcohol, and other mood-altering substances.  Some foods that are helpful in depression are: complex carbohydrates,  B vitamins, flaxseed, fish or fish oil, fresh fruits and vegetables.    Psychotherapy  Agree to participate in Individual Therapy (if recommended).    Medication  If prescribed medications, I agree to take them.  Missing doses can result in serious side effects.  I understand that drinking alcohol, or other illicit drug use, may cause potential side effects.  I will not stop my medication abruptly without first discussing it with my provider.    Staying Connected With Others  Stay in touch with my friends, family members, and my primary care provider/team.    Use your imagination  Be creative.  We all have a creative side; it doesnt matter if its oil painting, sand castles, or mud pies! This will also kick up the endorphins.    Witness Beauty  (AKA stop and smell the roses) Take a look outside, even in mid-winter. Notice colors, textures. Watch the squirrels and birds.     Service to others  Be of service to others.  There is always someone else in need.  By helping others we can get out of ourselves and remember the really important things.  This also provides opportunities for practicing all the other parts of the program.    Humor  Laugh and be silly!  Adjust your TV habits for less news and crime-drama and more comedy.    Control your stress  Try breathing deep, massage therapy, biofeedback, and meditation. Find time to relax each day.     Crisis Text Line  http://www.crisistextline.org    The Crisis Text Line serves anyone, in any type of crisis, providing access to free, 24/7 support and information via the medium people already use and trust:    Here's how it works:  1.  Text 682-249 from anywhere in the [place], anytime, about any type of crisis.  2.  A live, trained Crisis Counselor receives the text and responds quickly.  3.  The volunteer Crisis Counselor will help you move from a 'hot moment to a cool moment'.  My support system    Clinic Contact:  Phone number:    Contact 1:  Phone number:    Contact 2:   Phone number:    [facility]ith[facility]:  Phone number:    Therapist:  Phone number:    [facility] center:    Phone number:    Other community support:  Phone number:

## 2021-06-22 NOTE — TELEPHONE ENCOUNTER
Patient Returning Call  Reason for call:  Medication  Information relayed to patient:  [facility] contacted [facility], they have the medication and will fill for patient.  No further action needed.  Patient has additional questions:  No  If YES, what are your questions/concerns:  N/A  Okay to leave a detailed message?: No call back needed

## 2021-06-22 NOTE — TELEPHONE ENCOUNTER
Patient Returning Call  Reason for call:  Pharmacy and medication questions  Information relayed to patient:  The patient now has to use [facility] due to insurance change.  She is baffled with the question about alternative or pharmacy.  What do you advise is her questions.  She then stated she needs the medication so an alternative please.   Patient has additional questions:  No  If YES, what are your questions/concerns:  NA  Okay to leave a detailed message?: Yes [phone]

## 2021-06-22 NOTE — TELEPHONE ENCOUNTER
Medication Question or Clarification  Who is calling: Pharmacy: [facility]  What medication are you calling about?: 112 MG levothyroxine  What dose do you take?:  112 mg  How often are you taking the medication?:  daily  Who prescribed the medication?: Cliff  What is your question/concern?:  The 112 Mg is on backorder please send an alternative  Pharmacy:  [facility]   Okay to leave a detailed message?: No  Site CMT - Please call the pharmacy to obtain any additional needed information.

## 2021-06-22 NOTE — TELEPHONE ENCOUNTER
Left message to call back for: patient  Information to relay to patient:  [facility]    Does she want alternative medication or change to a different pharmacy?

## 2021-06-22 NOTE — TELEPHONE ENCOUNTER
Contacted pt and stated for her to [facility] another [facility] to see if they have the Levothyroxine in stock, or she can call her insurance company to see what is a covered alternative under her plan.    Pt  is call back and let us know where to send rx due to her pharmacy not having in stock.

## 2021-06-24 NOTE — TELEPHONE ENCOUNTER
Who is calling:  patient  Reason for Call:  Needs shingles shot do you have serum?  Date of last appointment with primary care: unknown  Okay to leave a detailed message: No

## 2021-06-27 ENCOUNTER — HEALTH MAINTENANCE LETTER (OUTPATIENT)
Age: 71
End: 2021-06-27

## 2021-07-13 ENCOUNTER — RECORDS - HEALTHEAST (OUTPATIENT)
Dept: ADMINISTRATIVE | Facility: CLINIC | Age: 71
End: 2021-07-13

## 2021-07-21 ENCOUNTER — RECORDS - HEALTHEAST (OUTPATIENT)
Dept: ADMINISTRATIVE | Facility: CLINIC | Age: 71
End: 2021-07-21

## 2021-08-10 ENCOUNTER — OFFICE VISIT (OUTPATIENT)
Dept: FAMILY MEDICINE | Facility: CLINIC | Age: 71
End: 2021-08-10
Payer: COMMERCIAL

## 2021-08-10 VITALS
OXYGEN SATURATION: 96 % | HEIGHT: 62 IN | DIASTOLIC BLOOD PRESSURE: 80 MMHG | HEART RATE: 70 BPM | RESPIRATION RATE: 18 BRPM | SYSTOLIC BLOOD PRESSURE: 130 MMHG | BODY MASS INDEX: 32.76 KG/M2 | WEIGHT: 178 LBS

## 2021-08-10 DIAGNOSIS — Z01.818 PRE-OP EXAM: Primary | ICD-10-CM

## 2021-08-10 DIAGNOSIS — E03.9 HYPOTHYROIDISM, UNSPECIFIED TYPE: ICD-10-CM

## 2021-08-10 DIAGNOSIS — H40.1130 PRIMARY OPEN ANGLE GLAUCOMA OF BOTH EYES, UNSPECIFIED GLAUCOMA STAGE: ICD-10-CM

## 2021-08-10 DIAGNOSIS — H25.9 AGE-RELATED CATARACT OF BOTH EYES, UNSPECIFIED AGE-RELATED CATARACT TYPE: ICD-10-CM

## 2021-08-10 DIAGNOSIS — E78.5 HYPERLIPIDEMIA, UNSPECIFIED HYPERLIPIDEMIA TYPE: ICD-10-CM

## 2021-08-10 LAB
ANION GAP SERPL CALCULATED.3IONS-SCNC: 10 MMOL/L (ref 5–18)
BUN SERPL-MCNC: 12 MG/DL (ref 8–22)
CALCIUM SERPL-MCNC: 10 MG/DL (ref 8.5–10.5)
CHLORIDE BLD-SCNC: 103 MMOL/L (ref 98–107)
CHOLEST SERPL-MCNC: 238 MG/DL
CO2 SERPL-SCNC: 28 MMOL/L (ref 22–31)
CREAT SERPL-MCNC: 0.77 MG/DL (ref 0.6–1.1)
FASTING STATUS PATIENT QL REPORTED: YES
GFR SERPL CREATININE-BSD FRML MDRD: 79 ML/MIN/1.73M2
GLUCOSE BLD-MCNC: 119 MG/DL (ref 70–125)
HDLC SERPL-MCNC: 66 MG/DL
LDLC SERPL CALC-MCNC: 131 MG/DL
POTASSIUM BLD-SCNC: 4.7 MMOL/L (ref 3.5–5)
SODIUM SERPL-SCNC: 141 MMOL/L (ref 136–145)
TRIGL SERPL-MCNC: 205 MG/DL
TSH SERPL DL<=0.005 MIU/L-ACNC: 0.94 UIU/ML (ref 0.3–5)

## 2021-08-10 PROCEDURE — 80048 BASIC METABOLIC PNL TOTAL CA: CPT | Performed by: NURSE PRACTITIONER

## 2021-08-10 PROCEDURE — 84443 ASSAY THYROID STIM HORMONE: CPT | Performed by: NURSE PRACTITIONER

## 2021-08-10 PROCEDURE — 36415 COLL VENOUS BLD VENIPUNCTURE: CPT | Performed by: NURSE PRACTITIONER

## 2021-08-10 PROCEDURE — 99214 OFFICE O/P EST MOD 30 MIN: CPT | Performed by: NURSE PRACTITIONER

## 2021-08-10 PROCEDURE — 80061 LIPID PANEL: CPT | Performed by: NURSE PRACTITIONER

## 2021-08-10 RX ORDER — PREDNISOLONE ACETATE 10 MG/ML
SUSPENSION/ DROPS OPHTHALMIC
COMMUNITY
Start: 2021-06-24 | End: 2022-08-17

## 2021-08-10 RX ORDER — KETOROLAC TROMETHAMINE 5 MG/ML
SOLUTION OPHTHALMIC
COMMUNITY
Start: 2021-06-25 | End: 2022-08-17

## 2021-08-10 RX ORDER — GATIFLOXACIN 5 MG/ML
SOLUTION/ DROPS OPHTHALMIC
COMMUNITY
Start: 2021-06-24 | End: 2022-08-17

## 2021-08-10 ASSESSMENT — MIFFLIN-ST. JEOR: SCORE: 1285.65

## 2021-08-10 NOTE — PROGRESS NOTES
[facility]  [address]  [place]  [facility] [address]  Phone: [phone]  Fax: [phone]  Primary Provider: Samra Coronado  Pre-op Performing Provider: SAMRA CORONADO        PREOPERATIVE EVALUATION:  Today's date: 8/10/2021    Sarah Beth Aceves is a 70 year old female who presents for a preoperative evaluation and med check.     Surgical Information:  Surgery/Procedure: Rt Cataract 08/20/21 LT Cataract 09/03/21  Surgery Location: [facility]  Surgeon: Dr. Arteaga  Surgery Date: 08/20/21 and 09/03/21  Time of Surgery: unknown  Where patient plans to recover: At home with family  Fax number for surgical facility: [phone]    Type of Anesthesia Anticipated: to be determined    Assessment & Plan     The proposed surgical procedure is considered LOW risk.    Pre-op exam      Age-related cataract of both eyes, unspecified age-related cataract type    Requesting office visit notes from [facility] and [facility] to review before her procedure in the next 2 weeks  I have asked her to check with the eye clinic in regards to whether or not she needs Covid testing, she was uncertain of this today.     Primary open angle glaucoma of both eyes, unspecified glaucoma stage    Requesting office visit notes from [facility] and [facility] to review before her procedure in the next 2 weeks  I have asked her to check with the eye clinic in regards to whether or not she needs Covid testing, she was uncertain of this today.     Hyperlipidemia, unspecified hyperlipidemia type    - REVIEW OF HEALTH MAINTENANCE PROTOCOL ORDERS  - BASIC METABOLIC PANEL  - Lipid Profile  She is fasting today, she is taking a statin    Hypothyroidism, unspecified type    - REVIEW OF HEALTH MAINTENANCE PROTOCOL ORDERS  - TSH  No new symptoms to report in regards to hypothyriod           Risks and Recommendations:  The patient has the following  additional risks and recommendations for perioperative complications:   - No identified additional risk factors other than previously addressed    Medication Instructions:  Patient is to take all scheduled medications on the day of surgery    RECOMMENDATION:  APPROVAL GIVEN to proceed with proposed procedure, without further diagnostic evaluation.    Review of external notes as documented above         33 minutes spent on the date of the encounter doing chart review, review of outside records, review of test results, interpretation of tests, patient visit and documentation         Subjective     HPI related to upcoming procedure: bilateral cataract surgery, stent placement for glaucoma.  Evaluated by [facility] were correct evaluation and follow-up for her glaucoma.  She did report decreased vision in regards to distance and near vision over the last year or so in both of the eyes.  She reports seeing better in the distance without her glasses now when she is watching TV.  She does have to blur to see small print and her vision is foggy in both eyes.  At night, she does notice significant glare around lights when she is driving.  She has no history of trauma or ocular injuries.  No out of the ordinary social history is reported.  Past medical history is unremarkable.  She denied any reports of visual floaters or light flashes, denied experiencing routine headaches or double vision.    Hyperlipidemia: She is taking her statin medication as prescribed.  She does not follow any special exercise programs.  Current BMI 32.5.  She is a former smoker who quit in 2005.  Alcohol use is socially, she denies any illicit drug use.  She has no history of coronary artery disease or previous strokes.  She denies any joint or muscle aches, chest pain or heart palpitations today.    Hypothyroidism: She is taking her levothyroxine as prescribed.  She denies any changes in regards to fatigue, skin or hair changes,  constipation, feeling jittery or palpitations.  She denies any significant weight changes.    Preop Questions 8/8/2021   1. Have you ever had a heart attack or stroke? No   2. Have you ever had surgery on your heart or blood vessels, such as a stent placement, a coronary artery bypass, or surgery on an artery in your head, neck, heart, or legs? No   3. Do you have chest pain with activity? No   4. Do you have a history of  heart failure? No   5. Do you currently have a cold, bronchitis or symptoms of other infection? No   6. Do you have a cough, shortness of breath, or wheezing? No   7. Do you or anyone in your family have previous history of blood clots? No   8. Do you or does anyone in your family have a serious bleeding problem such as prolonged bleeding following surgeries or cuts? No   9. Have you ever had problems with anemia or been told to take iron pills? No   10. Have you had any abnormal blood loss such as black, tarry or bloody stools, or abnormal vaginal bleeding? No   11. Have you ever had a blood transfusion? No   12. Are you willing to have a blood transfusion if it is medically needed before, during, or after your surgery? Yes   13. Have you or any of your relatives ever had problems with anesthesia? No   14. Do you have sleep apnea, excessive snoring or daytime drowsiness? No   15. Do you have any artifical heart valves or other implanted medical devices like a pacemaker, defibrillator, or continuous glucose monitor? No   16. Do you have artificial joints? No   17. Are you allergic to latex? No       Health Care Directive:  Patient does not have a Health Care Directive or Living Will: Patient states has Advance Directive and will bring in a copy to clinic. she is working on this at home, it is partially completed.     Preoperative Review of :   reviewed - no record of controlled substances prescribed.      Status of Chronic Conditions:  HYPERLIPIDEMIA - Patient has a long history of  significant Hyperlipidemia requiring medication for treatment with recent good control. Patient reports no problems or side effects with the medication.     HYPOTHYROIDISM - Patient has a longstanding history of chronic Hypothyroidism. Patient has been doing well, noting no tremor, insomnia, hair loss or changes in skin texture. Continues to take medications as directed, without adverse reactions or side effects. Last TSH   Lab Results   Component Value Date    TSH 2.85 11/06/2020   .        Review of Systems  CONSTITUTIONAL: NEGATIVE for fever, chills, change in weight  ENT/MOUTH: NEGATIVE for ear, mouth and throat problems  RESP: NEGATIVE for significant cough or SOB  CV: NEGATIVE for chest pain, palpitations or peripheral edema    Patient Active Problem List    Diagnosis Date Noted     Age-related cataract of both eyes, unspecified age-related cataract type 08/10/2021     Priority: Medium     Primary open angle glaucoma of both eyes, unspecified glaucoma stage 08/10/2021     Priority: Medium     Insomnia, unspecified type 11/24/2020     Priority: Medium     Reactive depression 11/06/2020     Priority: Medium     Anxiety 11/06/2020     Priority: Medium     Hyperlipemia      Priority: Medium     Created by [employer] [facility] Annotation: Apr 14 2008  2:48PM -  ,  : hdl 80'sldl 140's         Hypothyroidism      Priority: Medium     Created by Conversion  Replacement Utility updated for latest IMO load         Osteopenia      Priority: Medium     Created by [employer] [facility] Annotation: Apr 14 2008  4:02PM - Loly Silva:   SPINE>HIP  Replacement Utility updated for latest IMO load         Vitamin D Deficiency      Priority: Medium     Created by Conversion  Replacement Utility updated for latest IMO load         Menopause Has Occurred      Priority: Medium     Created by Conversion  Replacement Utility updated for latest IMO load         Skin Neoplasm Of Uncertain Behavior      Priority: Medium      Created by Conversion          Past Medical History:   Diagnosis Date     Asymptomatic postmenopausal status (age-related) (natural)     age 56     Cervical high risk human papillomavirus (HPV) DNA test positive 2006    ASCUS Pap, favor benign, HRHPV positive, 2006     History of blepharoplasty 2006    Integumentary Prodecures Blepharoplasty     History of pregnancy     NSVDX2     Left radial fracture 2018    evaluated by orthopedic     Raynaud's syndrome     Created by Conversion      Past Surgical History:   Procedure Laterality Date     BIOPSY BREAST  08    Description: Biopsy Breast Percutaneous Needle Core;  Proc Date: 2008; 2 areas on the same day.     TUBAL LIGATION       Current Outpatient Medications   Medication Sig Dispense Refill     calcium carbonate (OS-ELOISE) 600 mg calcium (1,500 mg) tablet [CALCIUM CARBONATE (OS-ELOISE) 600 MG CALCIUM (1,500 MG) TABLET] Take 1 tablet (600 mg total) by mouth 2 (two) times a day with meals. 180 tablet 3     gatifloxacin (ZYMAXID) 0.5 % ophthalmic solution        ketorolac (ACULAR) 0.5 % ophthalmic solution        latanoprost (XALATAN) 0.005 % ophthalmic solution [LATANOPROST (XALATAN) 0.005 % OPHTHALMIC SOLUTION]        levothyroxine (SYNTHROID, LEVOTHROID) 112 MCG tablet [LEVOTHYROXINE (SYNTHROID, LEVOTHROID) 112 MCG TABLET] TAKE 1 TABLET BY MOUTH EVERY DAY AT 6AM. NEEDS TO BE SEEN 90 tablet 2     lovastatin (MEVACOR) 40 MG tablet [LOVASTATIN (MEVACOR) 40 MG TABLET] TAKE 1 TABLET BY MOUTH EVERY DAY 90 tablet 2     prednisoLONE acetate (PRED FORTE) 1 % ophthalmic suspension        timolol maleate (TIMOPTIC) 0.5 % ophthalmic solution [TIMOLOL MALEATE (TIMOPTIC) 0.5 % OPHTHALMIC SOLUTION] 1 drop daily in each eye 10 mL 12       No Known Allergies     Social History     Tobacco Use     Smoking status: Former Smoker     Quit date: 2005     Years since quittin.6     Smokeless tobacco: Never Used     Tobacco comment: No  "exposure   Substance Use Topics     Alcohol use: Yes     Alcohol/week: 2.5 standard drinks     Family History   Problem Relation Age of Onset     Other - See Comments Mother         Acute bowel Ischemia/Infarction;  2009     Hypertension Mother      Coronary Artery Disease Mother         Mother had stent age 73     Osteoarthritis Mother         of the Hip; S/P hip replacement     Osteoporosis Mother         severe     Thyroid Disease Mother      Aneurysm Father         Aneurysm of the Aortic Arch; Grafted X 2;  age 82     Coronary Artery Disease Father         S/P angioplasty     Emphysema Father      Uterine Cancer Sister 58.00        Uterine Papillary serous carcinoma, diagnosed age 58, Stage IV. Diagnosed by abnormal Pap.     Osteoporosis Sister         premature menopause in her 30's     Thyroid Disease Sister      History   Drug Use No         Objective     /80   Pulse 70   Resp 18   Ht 1.575 m (5' 2\")   Wt 80.7 kg (178 lb)   SpO2 96%   Breastfeeding No   BMI 32.56 kg/m      Physical Exam  GENERAL APPEARANCE: healthy, alert and no distress  EYES: PERRL, EOM's intact, no redness, swelling or drainage.   HENT: ear canals and TM's normal and nose and mouth without ulcers or lesions  NECK: no sign of enlargement or thyroid mass  RESP: lungs clear to auscultation - no rales, rhonchi or wheezes  CV: regular rate and rhythm, normal S1 S2, no S3 or S4 and no murmur, click or rub   ABDOMEN: soft, nontender, no HSM or masses and bowel sounds normal  NEURO: Normal strength and tone, sensory exam grossly normal, mentation intact and speech normal    Recent Labs   Lab Test 20  0956 19  0953   HGB 13.8 13.4    140   POTASSIUM 4.7 4.5   CR 0.78 0.76        Diagnostics:  Recent Results (from the past 168 hour(s))   BASIC METABOLIC PANEL    Collection Time: 08/10/21 11:02 AM   Result Value Ref Range    Sodium 141 136 - 145 mmol/L    Potassium 4.7 3.5 - 5.0 mmol/L    Chloride 103 98 - " 107 mmol/L    Carbon Dioxide (CO2) 28 22 - 31 mmol/L    Anion Gap 10 5 - 18 mmol/L    Urea Nitrogen 12 8 - 22 mg/dL    Creatinine 0.77 0.60 - 1.10 mg/dL    Calcium 10.0 8.5 - 10.5 mg/dL    Glucose 119 70 - 125 mg/dL    GFR Estimate 79 >60 mL/min/1.73m2   Lipid Profile    Collection Time: 08/10/21 11:02 AM   Result Value Ref Range    Cholesterol 238 (H) <=199 mg/dL    Triglycerides 205 (H) <=149 mg/dL    Direct Measure HDL 66 >=50 mg/dL    LDL Cholesterol Calculated 131 (H) <=129 mg/dL    Patient Fasting > 8hrs? Yes    TSH    Collection Time: 08/10/21 11:02 AM   Result Value Ref Range    TSH 0.94 0.30 - 5.00 uIU/mL      No EKG required, no history of coronary heart disease, significant arrhythmia, peripheral arterial disease or other structural heart disease.    Revised Cardiac Risk Index (RCRI):  The patient has the following serious cardiovascular risks for perioperative complications:   - No serious cardiac risks = 0 points     RCRI Interpretation: 0 points: Class I (very low risk - 0.4% complication rate)           Signed Electronically by: America Coronado NP  Copy of this evaluation report is provided to requesting physician.

## 2021-08-12 ENCOUNTER — TELEPHONE (OUTPATIENT)
Dept: FAMILY MEDICINE | Facility: CLINIC | Age: 71
End: 2021-08-12

## 2021-08-12 DIAGNOSIS — E78.5 HYPERLIPIDEMIA, UNSPECIFIED HYPERLIPIDEMIA TYPE: Primary | ICD-10-CM

## 2021-08-12 RX ORDER — ATORVASTATIN CALCIUM 10 MG/1
10 TABLET, FILM COATED ORAL DAILY
Qty: 90 TABLET | Refills: 1 | Status: SHIPPED | OUTPATIENT
Start: 2021-08-12 | End: 2022-01-25 | Stop reason: DRUGHIGH

## 2021-08-12 NOTE — TELEPHONE ENCOUNTER
Spoke with patient regarding recent lab results. She continues to have an elevated total and LDL level despite taking the lovastatin. We did discuss changing her over to the atorvastatin to target the LDL and total cholesterol with a goal of reducing those levels. She did agree with this plan, prescription for atorvastatin was sent to her preferred pharmacy and she was advised to stop the lovastatin. We will recheck her fasting levels again in 6 months.

## 2021-09-07 DIAGNOSIS — E03.9 HYPOTHYROIDISM: ICD-10-CM

## 2021-09-07 RX ORDER — LEVOTHYROXINE SODIUM 112 UG/1
112 TABLET ORAL DAILY
Qty: 90 TABLET | Refills: 3 | Status: SHIPPED | OUTPATIENT
Start: 2021-09-07 | End: 2022-08-22

## 2021-09-07 NOTE — TELEPHONE ENCOUNTER
"Last Written Prescription Date:  11/8/20  Last Fill Quantity: 90,  # refills: 2   Last office visit provider:  8/10/21     Requested Prescriptions   Pending Prescriptions Disp Refills     levothyroxine (SYNTHROID/LEVOTHROID) 112 MCG tablet [Pharmacy Med Name: LEVOTHYROXINE 112 MCG TABLET] 90 tablet 2     Sig: TAKE 1 TABLET BY MOUTH EVERY DAY AT 6AM. NEEDS TO BE SEEN       Thyroid Protocol Passed - 9/7/2021  8:51 AM        Passed - Patient is 12 years or older        Passed - Recent (12 mo) or future (30 days) visit within the authorizing provider's specialty     Patient has had an office visit with the authorizing provider or a provider within the authorizing providers department within the previous 12 mos or has a future within next 30 days. See \"Patient Info\" tab in inbasket, or \"Choose Columns\" in Meds & Orders section of the refill encounter.              Passed - Medication is active on med list        Passed - Normal TSH on file in past 12 months     Recent Labs   Lab Test 08/10/21  1102   TSH 0.94              Passed - No active pregnancy on record     If patient is pregnant or has had a positive pregnancy test, please check TSH.          Passed - No positive pregnancy test in past 12 months     If patient is pregnant or has had a positive pregnancy test, please check TSH.               Romeo Rodney RN 09/07/21 2:08 PM  "

## 2021-10-16 ENCOUNTER — HEALTH MAINTENANCE LETTER (OUTPATIENT)
Age: 71
End: 2021-10-16

## 2021-12-11 ENCOUNTER — HEALTH MAINTENANCE LETTER (OUTPATIENT)
Age: 71
End: 2021-12-11

## 2022-01-19 DIAGNOSIS — E78.5 HYPERLIPIDEMIA, UNSPECIFIED HYPERLIPIDEMIA TYPE: Primary | ICD-10-CM

## 2022-01-24 ENCOUNTER — LAB (OUTPATIENT)
Dept: LAB | Facility: CLINIC | Age: 72
End: 2022-01-24
Payer: COMMERCIAL

## 2022-01-24 DIAGNOSIS — E78.5 HYPERLIPIDEMIA, UNSPECIFIED HYPERLIPIDEMIA TYPE: ICD-10-CM

## 2022-01-24 LAB
CHOLEST SERPL-MCNC: 283 MG/DL
FASTING STATUS PATIENT QL REPORTED: YES
HDLC SERPL-MCNC: 66 MG/DL
LDLC SERPL CALC-MCNC: 155 MG/DL
TRIGL SERPL-MCNC: 309 MG/DL

## 2022-01-24 PROCEDURE — 36415 COLL VENOUS BLD VENIPUNCTURE: CPT

## 2022-01-24 PROCEDURE — 80061 LIPID PANEL: CPT

## 2022-01-25 DIAGNOSIS — E78.5 HYPERLIPIDEMIA, UNSPECIFIED HYPERLIPIDEMIA TYPE: Primary | ICD-10-CM

## 2022-01-25 RX ORDER — ATORVASTATIN CALCIUM 40 MG/1
40 TABLET, FILM COATED ORAL DAILY
Qty: 90 TABLET | Refills: 1 | Status: SHIPPED | OUTPATIENT
Start: 2022-01-25 | End: 2022-07-24

## 2022-02-07 ENCOUNTER — HOSPITAL ENCOUNTER (OUTPATIENT)
Dept: MAMMOGRAPHY | Facility: CLINIC | Age: 72
Discharge: HOME | End: 2022-02-07
Attending: NURSE PRACTITIONER | Admitting: NURSE PRACTITIONER
Payer: COMMERCIAL

## 2022-02-07 DIAGNOSIS — Z12.31 VISIT FOR SCREENING MAMMOGRAM: ICD-10-CM

## 2022-02-07 PROCEDURE — 77067 SCR MAMMO BI INCL CAD: CPT

## 2022-07-23 DIAGNOSIS — E78.5 HYPERLIPIDEMIA, UNSPECIFIED HYPERLIPIDEMIA TYPE: ICD-10-CM

## 2022-07-24 RX ORDER — ATORVASTATIN CALCIUM 40 MG/1
TABLET, FILM COATED ORAL
Qty: 90 TABLET | Refills: 0 | Status: SHIPPED | OUTPATIENT
Start: 2022-07-24 | End: 2022-08-23 | Stop reason: DRUGHIGH

## 2022-07-24 NOTE — TELEPHONE ENCOUNTER
"Last Written Prescription Date:  1/25/22  Last Fill Quantity: 90,  # refills: 1   Last office visit provider:  8/10/21     Requested Prescriptions   Pending Prescriptions Disp Refills     atorvastatin (LIPITOR) 40 MG tablet [Pharmacy Med Name: ATORVASTATIN 40 MG TABLET] 90 tablet 1     Sig: TAKE 1 TABLET BY MOUTH EVERY DAY       Statins Protocol Passed - 7/23/2022  2:04 PM        Passed - LDL on file in past 12 months     Recent Labs   Lab Test 01/24/22  0941   *             Passed - No abnormal creatine kinase in past 12 months     No lab results found.             Passed - Recent (12 mo) or future (30 days) visit within the authorizing provider's specialty     Patient has had an office visit with the authorizing provider or a provider within the authorizing providers department within the previous 12 mos or has a future within next 30 days. See \"Patient Info\" tab in inbasket, or \"Choose Columns\" in Meds & Orders section of the refill encounter.              Passed - Medication is active on med list        Passed - Patient is age 18 or older        Passed - No active pregnancy on record        Passed - No positive pregnancy test in past 12 months             Belén Cohen, RN 07/24/22 11:49 AM  "

## 2022-08-12 ASSESSMENT — ENCOUNTER SYMPTOMS
DIZZINESS: 0
WEAKNESS: 0
HEADACHES: 0
EYE PAIN: 0
DYSURIA: 0
DIARRHEA: 0
NAUSEA: 0
NERVOUS/ANXIOUS: 0
PARESTHESIAS: 0
FEVER: 0
PALPITATIONS: 0
JOINT SWELLING: 0
HEMATOCHEZIA: 0
CONSTIPATION: 0
FREQUENCY: 0
SHORTNESS OF BREATH: 0
ABDOMINAL PAIN: 0
HEMATURIA: 0
CHILLS: 0
SORE THROAT: 0
HEARTBURN: 0
ARTHRALGIAS: 0
MYALGIAS: 0
COUGH: 0
BREAST MASS: 0

## 2022-08-12 ASSESSMENT — ACTIVITIES OF DAILY LIVING (ADL): CURRENT_FUNCTION: NO ASSISTANCE NEEDED

## 2022-08-19 ENCOUNTER — OFFICE VISIT (OUTPATIENT)
Dept: FAMILY MEDICINE | Facility: CLINIC | Age: 72
End: 2022-08-19
Payer: COMMERCIAL

## 2022-08-19 VITALS
RESPIRATION RATE: 16 BRPM | HEIGHT: 62 IN | HEART RATE: 66 BPM | SYSTOLIC BLOOD PRESSURE: 100 MMHG | OXYGEN SATURATION: 100 % | WEIGHT: 185 LBS | BODY MASS INDEX: 34.04 KG/M2 | DIASTOLIC BLOOD PRESSURE: 70 MMHG | TEMPERATURE: 97.9 F

## 2022-08-19 DIAGNOSIS — Z87.891 FORMER CIGARETTE SMOKER: ICD-10-CM

## 2022-08-19 DIAGNOSIS — Z00.00 ENCOUNTER FOR ANNUAL WELLNESS EXAM IN MEDICARE PATIENT: Primary | ICD-10-CM

## 2022-08-19 DIAGNOSIS — E78.5 HYPERLIPIDEMIA, UNSPECIFIED HYPERLIPIDEMIA TYPE: ICD-10-CM

## 2022-08-19 DIAGNOSIS — Z71.89 ACP (ADVANCE CARE PLANNING): ICD-10-CM

## 2022-08-19 DIAGNOSIS — M85.89 OSTEOPENIA OF MULTIPLE SITES: ICD-10-CM

## 2022-08-19 DIAGNOSIS — E03.9 HYPOTHYROIDISM, UNSPECIFIED TYPE: ICD-10-CM

## 2022-08-19 LAB
ANION GAP SERPL CALCULATED.3IONS-SCNC: 10 MMOL/L (ref 7–15)
BUN SERPL-MCNC: 7.6 MG/DL (ref 8–23)
CALCIUM SERPL-MCNC: 10.4 MG/DL (ref 8.8–10.2)
CHLORIDE SERPL-SCNC: 101 MMOL/L (ref 98–107)
CHOLEST SERPL-MCNC: 233 MG/DL
CREAT SERPL-MCNC: 0.65 MG/DL (ref 0.51–0.95)
DEPRECATED HCO3 PLAS-SCNC: 29 MMOL/L (ref 22–29)
GFR SERPL CREATININE-BSD FRML MDRD: >90 ML/MIN/1.73M2
GLUCOSE SERPL-MCNC: 107 MG/DL (ref 70–99)
HDLC SERPL-MCNC: 63 MG/DL
HGB BLD-MCNC: 13.7 G/DL (ref 11.7–15.7)
LDLC SERPL CALC-MCNC: 126 MG/DL
NONHDLC SERPL-MCNC: 170 MG/DL
POTASSIUM SERPL-SCNC: 4.9 MMOL/L (ref 3.4–5.3)
SODIUM SERPL-SCNC: 140 MMOL/L (ref 136–145)
TRIGL SERPL-MCNC: 218 MG/DL
TSH SERPL DL<=0.005 MIU/L-ACNC: 0.77 UIU/ML (ref 0.3–4.2)

## 2022-08-19 PROCEDURE — 84443 ASSAY THYROID STIM HORMONE: CPT | Performed by: NURSE PRACTITIONER

## 2022-08-19 PROCEDURE — 82306 VITAMIN D 25 HYDROXY: CPT | Performed by: NURSE PRACTITIONER

## 2022-08-19 PROCEDURE — 80061 LIPID PANEL: CPT | Performed by: NURSE PRACTITIONER

## 2022-08-19 PROCEDURE — 85018 HEMOGLOBIN: CPT | Performed by: NURSE PRACTITIONER

## 2022-08-19 PROCEDURE — G0439 PPPS, SUBSEQ VISIT: HCPCS | Performed by: NURSE PRACTITIONER

## 2022-08-19 PROCEDURE — 99214 OFFICE O/P EST MOD 30 MIN: CPT | Mod: 25 | Performed by: NURSE PRACTITIONER

## 2022-08-19 PROCEDURE — 80048 BASIC METABOLIC PNL TOTAL CA: CPT | Performed by: NURSE PRACTITIONER

## 2022-08-19 PROCEDURE — 36415 COLL VENOUS BLD VENIPUNCTURE: CPT | Performed by: NURSE PRACTITIONER

## 2022-08-19 ASSESSMENT — ENCOUNTER SYMPTOMS
JOINT SWELLING: 0
DIARRHEA: 0
HEMATURIA: 0
COUGH: 0
HEARTBURN: 0
ABDOMINAL PAIN: 0
DIZZINESS: 0
BREAST MASS: 0
WEAKNESS: 0
HEADACHES: 0
SHORTNESS OF BREATH: 0
NERVOUS/ANXIOUS: 0
CONSTIPATION: 0
MYALGIAS: 0
SORE THROAT: 0
ARTHRALGIAS: 0
PALPITATIONS: 0
FEVER: 0
CHILLS: 0
HEMATOCHEZIA: 0
PARESTHESIAS: 0
NAUSEA: 0
EYE PAIN: 0
DYSURIA: 0
FREQUENCY: 0

## 2022-08-19 ASSESSMENT — PAIN SCALES - GENERAL: PAINLEVEL: NO PAIN (0)

## 2022-08-19 ASSESSMENT — ACTIVITIES OF DAILY LIVING (ADL): CURRENT_FUNCTION: NO ASSISTANCE NEEDED

## 2022-08-19 ASSESSMENT — PATIENT HEALTH QUESTIONNAIRE - PHQ9
10. IF YOU CHECKED OFF ANY PROBLEMS, HOW DIFFICULT HAVE THESE PROBLEMS MADE IT FOR YOU TO DO YOUR WORK, TAKE CARE OF THINGS AT HOME, OR GET ALONG WITH OTHER PEOPLE: NOT DIFFICULT AT ALL
SUM OF ALL RESPONSES TO PHQ QUESTIONS 1-9: 2
SUM OF ALL RESPONSES TO PHQ QUESTIONS 1-9: 2

## 2022-08-19 NOTE — PROGRESS NOTES
"SUBJECTIVE:   Sarah Beth Aceves is a 71 year old female who presents for Preventive Visit, med check. Up to date with mammogram and colonoscopy. She is overdue for repeat bone scan due to history of osteopenia with low fracture risk, last completed in 2019, recommendations were to follow up in 2 years. She does not have a health care directive.       Patient has been advised of split billing requirements and indicates understanding: Yes  Are you in the first 12 months of your Medicare coverage?  No    Healthy Habits:     In general, how would you rate your overall health?  Fair    Frequency of exercise:  2-3 days/week    Duration of exercise:  15-30 minutes    Do you usually eat at least 4 servings of fruit and vegetables a day, include whole grains    & fiber and avoid regularly eating high fat or \"junk\" foods?  No    Taking medications regularly:  Yes    Medication side effects:  None    Ability to successfully perform activities of daily living:  No assistance needed    Home Safety:  No safety concerns identified    Hearing Impairment:  No hearing concerns    In the past 6 months, have you been bothered by leaking of urine?  No    In general, how would you rate your overall mental or emotional health?  Fair      PHQ-2 Total Score: 0    Additional concerns today:  No    Do you feel safe in your environment? Yes    Have you ever done Advance Care Planning? (For example, a Health Directive, POLST, or a discussion with a medical provider or your loved ones about your wishes): No, advance care planning information given to patient to review.  Advanced care planning was discussed at today's visit.       Fall risk  Fallen 2 or more times in the past year?: No  Any fall with injury in the past year?: No    Cognitive Screening   1) Repeat 3 items (Leader, Season, Table)    2) Clock draw: NORMAL  3) 3 item recall: Recalls 3 objects  Results: 3 items recalled: COGNITIVE IMPAIRMENT LESS LIKELY    Mini-CogTM Copyright S " Kishore. Licensed by the author for use in [facility]; reprinted with permission (pk@[facility]). All rights reserved.          Reviewed and updated as needed this visit by clinical staff   Tobacco  Allergies  Meds                Reviewed and updated as needed this visit by Provider                   Social History     Tobacco Use     Smoking status: Former Smoker     Quit date: 2005     Years since quittin.6     Smokeless tobacco: Never Used     Tobacco comment: No exposure   Substance Use Topics     Alcohol use: Yes     Alcohol/week: 2.5 standard drinks     If you drink alcohol do you typically have >3 drinks per day or >7 drinks per week? No    Alcohol Use 2022   Prescreen: >3 drinks/day or >7 drinks/week? No           Hyperlipidemia Follow-Up      Are you regularly taking any medication or supplement to lower your cholesterol?   Yes- taking atorvastatin 40 mg daily    Are you having muscle aches or other side effects that you think could be caused by your cholesterol lowering medication?  No     History of stroke or heart attack: no    Former smoker: quit in     Hypothyroidism Follow-up      Since last visit, patient describes the following symptoms: dry skin and anxiety    Rx: Levothyroxine 112 mcg daily    Surgery, goiter, Grave's disease: no      Current providers sharing in care for this patient include:   Patient Care Team:  America Coronado NP as PCP - General (Orthothist)  America Coronado NP as Assigned PCP    The following health maintenance items are reviewed in Epic and correct as of today:  Health Maintenance Due   Topic Date Due     LUNG CANCER SCREENING  Never done     ANNUAL REVIEW OF HM ORDERS  08/10/2022             Review of Systems   Constitutional: Negative for chills and fever.   HENT: Negative for congestion, ear pain, hearing loss and sore throat.    Eyes: Positive for visual disturbance. Negative for pain.   Respiratory: Negative for cough and shortness  "of breath.    Cardiovascular: Negative for chest pain, palpitations and peripheral edema.   Gastrointestinal: Negative for abdominal pain, constipation, diarrhea, heartburn, hematochezia and nausea.   Breasts:  Negative for tenderness, breast mass and discharge.   Genitourinary: Negative for dysuria, frequency, genital sores, hematuria, pelvic pain, urgency, vaginal bleeding and vaginal discharge.   Musculoskeletal: Negative for arthralgias, joint swelling and myalgias.   Skin: Negative for rash.   Neurological: Negative for dizziness, weakness, headaches and paresthesias.   Psychiatric/Behavioral: Negative for mood changes. The patient is not nervous/anxious.      Has known cataracts but had surgery to remove them. She has been having some issues in the right since and will be following up with eye specialist to determine the treatment plan for this.     OBJECTIVE:   /70 (BP Location: Left arm, Patient Position: Sitting, Cuff Size: Adult Regular)   Pulse 66   Temp 97.9  F (36.6  C) (Oral)   Resp 16   Ht 1.575 m (5' 2\")   Wt 83.9 kg (185 lb)   SpO2 100%   BMI 33.84 kg/m   Estimated body mass index is 33.84 kg/m  as calculated from the following:    Height as of this encounter: 1.575 m (5' 2\").    Weight as of this encounter: 83.9 kg (185 lb).  Physical Exam  GENERAL APPEARANCE: healthy, alert and no distress  EYES: Eyes grossly normal to inspection, PERRL and conjunctivae and sclerae normal  HENT: ear canals and TM's normal, nose and mouth without ulcers or lesions, oropharynx clear and oral mucous membranes moist  NECK: no adenopathy, no asymmetry, masses, or scars and thyroid normal to palpation  RESP: lungs clear to auscultation - no rales, rhonchi or wheezes  CV: regular rate and rhythm, normal S1 S2, no S3 or S4, no murmur, click or rub, no peripheral edema and peripheral pulses strong  ABDOMEN: soft, nontender, no hepatosplenomegaly, no masses and bowel sounds normal  MS: no musculoskeletal " defects are noted and gait is age appropriate without ataxia  SKIN: no suspicious lesions or rashes  NEURO: Normal strength and tone, sensory exam grossly normal, mentation intact and speech normal  PSYCH: mentation appears normal and affect normal/bright    Diagnostic Test Results:  Labs reviewed in Epic    ASSESSMENT / PLAN:   (Z00.00) Encounter for annual wellness exam in Medicare patient  (primary encounter diagnosis)  Comment:   Plan: Hemoglobin  I did inform the patient today that I will be leaving my practice so she will need to establish care with a new provider when she comes in for her next medication check or annual wellness visit in 6 to 12 months.  She will be receiving a letter with a list of providers that are taking on new patients.            (E78.5) Hyperlipidemia, unspecified hyperlipidemia type  Comment:   Plan: BASIC METABOLIC PANEL, Lipid Profile        Patient is fasting today, she is currently taking atorvastatin 40 mg daily.  We did adjust her dose the last time I saw her since her ASCVD risk score was over 7% at that time.  She is currently not taking in much in the way of fruits and vegetables so we did discuss increasing her intake of this and limiting her sugar intake, watching her processed food intake and soda intake.  She has started to exercise twice per week so she will continue to build on this as well.    (E03.9) Hypothyroidism, unspecified type  Comment:   Plan: TSH        She continues to take her levothyroxine daily without interruption, lab work is pending review today    (M85.89) Osteopenia of multiple sites  Comment:   Plan: Vitamin D deficiency screening, DX         Hip/Pelvis/Spine        She continues to take her Os-Manuel as prescribed.  She is overdue for an updated bone scan, her last scan showed osteopenia with low fracture risk in 2019    (Z87.891) Former cigarette smoker  Comment:   Plan: Based on the current Medicare guidelines, she does not qualify for lung cancer  "screening since she quit over 15 years ago.  She reports no ongoing concerns with upper respiratory issues, hemoptysis, weight loss etc.  She continues to remain smoke-free and has been for over 17 years now    (Z71.89) ACP (advance care planning)  Comment:   Plan: Honoring choices packet given to patient for completion    Patient has been advised of split billing requirements and indicates understanding: Yes    COUNSELING:  Reviewed preventive health counseling, as reflected in patient instructions       Regular exercise       Healthy diet/nutrition       Vision screening       Hearing screening       Dental care       Bladder control       Fall risk prevention       Osteoporosis prevention/bone health       Consider lung cancer screening for ages 55-80 years (77 for Medicare) and 20 pack-year smoking history , patient does not qualify at this time.        Colon cancer screening       Advanced Planning     Estimated body mass index is 33.84 kg/m  as calculated from the following:    Height as of this encounter: 1.575 m (5' 2\").    Weight as of this encounter: 83.9 kg (185 lb).    Weight management plan: Discussed healthy diet and exercise guidelines    She reports that she quit smoking about 17 years ago. She has never used smokeless tobacco.      Appropriate preventive services were discussed with this patient, including applicable screening as appropriate for cardiovascular disease, diabetes, osteopenia/osteoporosis, and glaucoma.  As appropriate for age/gender, discussed screening for colorectal cancer, prostate cancer, breast cancer, and cervical cancer. Checklist reviewing preventive services available has been given to the patient.    Reviewed patients plan of care and provided an AVS. The Basic Care Plan (routine screening as documented in Health Maintenance) for Sarah Beth meets the Care Plan requirement. This Care Plan has been established and reviewed with the Patient.    Counseling Resources:  ATP IV " Guidelines  Pooled Cohorts Equation Calculator  Breast Cancer Risk Calculator  Breast Cancer: Medication to Reduce Risk  FRAX Risk Assessment  ICSI Preventive Guidelines  Dietary Guidelines for Americans, 2010  Lionical's MyPlate  ASA Prophylaxis  Lung CA Screening    America Coronado NP  [facility]    Identified Health Risks:  Answers for HPI/ROS submitted by the patient on 8/19/2022  If you checked off any problems, how difficult have these problems made it for you to do your work, take care of things at home, or get along with other people?: Not difficult at all  PHQ9 TOTAL SCORE: 2

## 2022-08-20 LAB — DEPRECATED CALCIDIOL+CALCIFEROL SERPL-MC: 40 UG/L (ref 20–75)

## 2022-08-22 DIAGNOSIS — E03.9 HYPOTHYROIDISM, UNSPECIFIED TYPE: Primary | ICD-10-CM

## 2022-08-22 RX ORDER — LEVOTHYROXINE SODIUM 112 UG/1
112 TABLET ORAL DAILY
Qty: 90 TABLET | Refills: 3 | Status: SHIPPED | OUTPATIENT
Start: 2022-08-22 | End: 2023-03-23

## 2022-08-23 DIAGNOSIS — E78.5 HYPERLIPIDEMIA, UNSPECIFIED HYPERLIPIDEMIA TYPE: Primary | ICD-10-CM

## 2022-08-23 RX ORDER — ATORVASTATIN CALCIUM 80 MG/1
80 TABLET, FILM COATED ORAL DAILY
Qty: 90 TABLET | Refills: 1 | Status: SHIPPED | OUTPATIENT
Start: 2022-08-23 | End: 2023-02-24

## 2022-09-01 ENCOUNTER — ANCILLARY PROCEDURE (OUTPATIENT)
Dept: BONE DENSITY | Facility: CLINIC | Age: 72
End: 2022-09-01
Attending: NURSE PRACTITIONER
Payer: COMMERCIAL

## 2022-09-01 DIAGNOSIS — M85.89 OSTEOPENIA OF MULTIPLE SITES: ICD-10-CM

## 2022-09-01 PROCEDURE — 77080 DXA BONE DENSITY AXIAL: CPT | Mod: TC | Performed by: RADIOLOGY

## 2022-10-01 ENCOUNTER — HEALTH MAINTENANCE LETTER (OUTPATIENT)
Age: 72
End: 2022-10-01

## 2022-12-21 ENCOUNTER — TRANSFERRED RECORDS (OUTPATIENT)
Dept: HEALTH INFORMATION MANAGEMENT | Facility: CLINIC | Age: 72
End: 2022-12-21

## 2023-02-21 ENCOUNTER — HOSPITAL ENCOUNTER (OUTPATIENT)
Dept: MAMMOGRAPHY | Facility: CLINIC | Age: 73
Discharge: HOME | End: 2023-02-21
Attending: FAMILY MEDICINE | Admitting: FAMILY MEDICINE
Payer: COMMERCIAL

## 2023-02-21 DIAGNOSIS — Z12.31 SCREENING MAMMOGRAM FOR BREAST CANCER: ICD-10-CM

## 2023-02-21 PROCEDURE — 77067 SCR MAMMO BI INCL CAD: CPT

## 2023-02-22 DIAGNOSIS — E78.5 HYPERLIPIDEMIA, UNSPECIFIED HYPERLIPIDEMIA TYPE: ICD-10-CM

## 2023-02-24 RX ORDER — ATORVASTATIN CALCIUM 80 MG/1
TABLET, FILM COATED ORAL
Qty: 30 TABLET | Refills: 0 | Status: SHIPPED | OUTPATIENT
Start: 2023-02-24 | End: 2023-03-23

## 2023-02-24 NOTE — TELEPHONE ENCOUNTER
"Former patient of sydni & has not established care with another provider.  Please assign refill request to covering provider per clinic standard process.    Last Written Prescription Date:  8/23/22  Last Fill Quantity: 90,  # refills: 1   Last office visit provider:  8/19/22    Requested Prescriptions   Pending Prescriptions Disp Refills     atorvastatin (LIPITOR) 80 MG tablet [Pharmacy Med Name: ATORVASTATIN 80 MG TABLET] 90 tablet 1     Sig: TAKE 1 TABLET BY MOUTH EVERY DAY       Statins Protocol Failed - 2/23/2023  7:55 PM        Failed - Recent (12 mo) or future (30 days) visit within the authorizing provider's specialty     Patient has had an office visit with the authorizing provider or a provider within the authorizing providers department within the previous 12 mos or has a future within next 30 days. See \"Patient Info\" tab in inbasket, or \"Choose Columns\" in Meds & Orders section of the refill encounter.              Passed - LDL on file in past 12 months     Recent Labs   Lab Test 08/19/22  1422   *             Passed - No abnormal creatine kinase in past 12 months     No lab results found.             Passed - Medication is active on med list        Passed - Patient is age 18 or older        Passed - No active pregnancy on record        Passed - No positive pregnancy test in past 12 months             TREMAINE JANE RN 02/23/23 7:55 PM  "

## 2023-02-27 ENCOUNTER — HOSPITAL ENCOUNTER (OUTPATIENT)
Dept: MAMMOGRAPHY | Facility: CLINIC | Age: 73
Discharge: HOME | End: 2023-02-27
Attending: FAMILY MEDICINE | Admitting: FAMILY MEDICINE
Payer: COMMERCIAL

## 2023-02-27 DIAGNOSIS — R92.1 BREAST CALCIFICATIONS: ICD-10-CM

## 2023-02-27 PROCEDURE — 77065 DX MAMMO INCL CAD UNI: CPT | Mod: LT

## 2023-02-27 NOTE — LETTER
Sarah Beth Aceves  [address] [facility] [address]            February 27, 2023      Date of Exam: 2/27/23      Dear Sarah Beth:    Thank you for your recent visit.    Breast Imaging Result: Based on your recent breast imaging, you have a suspicious area that usually requires a biopsy, at which time a small tissue sample would be taken from your breast.      If you have already made these arrangements, please disregard this letter.    A report of your breast imaging results was sent to: Jovita Maxwell    Your breast imaging will become part of your medical file here at [facility] for at least 10 years. You are responsible for informing any new health care team or breast imaging facility of the date and location of this examination.    We appreciate the opportunity to participate in your health care.    Sincerely,  Dr. Jessica Rajan  [facility]

## 2023-03-07 ENCOUNTER — ANCILLARY PROCEDURE (OUTPATIENT)
Dept: MAMMOGRAPHY | Facility: CLINIC | Age: 73
End: 2023-03-07
Attending: FAMILY MEDICINE
Payer: COMMERCIAL

## 2023-03-07 DIAGNOSIS — R92.1 BREAST CALCIFICATIONS: ICD-10-CM

## 2023-03-07 PROCEDURE — 250N000009 HC RX 250: Performed by: RADIOLOGY

## 2023-03-07 PROCEDURE — 272N000715 MA STEREOTACTIC BREAST BIOPSY VACUUM LT

## 2023-03-07 PROCEDURE — 88305 TISSUE EXAM BY PATHOLOGIST: CPT | Mod: TC | Performed by: FAMILY MEDICINE

## 2023-03-07 RX ORDER — LIDOCAINE HYDROCHLORIDE AND EPINEPHRINE 10; 10 MG/ML; UG/ML
10 INJECTION, SOLUTION INFILTRATION; PERINEURAL ONCE
Status: COMPLETED | OUTPATIENT
Start: 2023-03-07 | End: 2023-03-07

## 2023-03-07 RX ADMIN — LIDOCAINE HYDROCHLORIDE AND EPINEPHRINE 10 ML: 10; 10 INJECTION, SOLUTION INFILTRATION; PERINEURAL at 14:50

## 2023-03-07 RX ADMIN — LIDOCAINE HYDROCHLORIDE 17 ML: 10 INJECTION, SOLUTION INFILTRATION; PERINEURAL at 14:49

## 2023-03-07 NOTE — DISCHARGE INSTRUCTIONS

## 2023-03-16 LAB
PATH REPORT.COMMENTS IMP SPEC: NORMAL
PATH REPORT.FINAL DX SPEC: NORMAL
PATH REPORT.GROSS SPEC: NORMAL
PATH REPORT.MICROSCOPIC SPEC OTHER STN: NORMAL
PATH REPORT.RELEVANT HX SPEC: NORMAL
PHOTO IMAGE: NORMAL

## 2023-03-16 PROCEDURE — 88342 IMHCHEM/IMCYTCHM 1ST ANTB: CPT | Mod: 26 | Performed by: PATHOLOGY

## 2023-03-16 PROCEDURE — 88305 TISSUE EXAM BY PATHOLOGIST: CPT | Mod: 26 | Performed by: PATHOLOGY

## 2023-03-16 PROCEDURE — 88341 IMHCHEM/IMCYTCHM EA ADD ANTB: CPT | Mod: 26 | Performed by: PATHOLOGY

## 2023-03-17 ENCOUNTER — TELEPHONE (OUTPATIENT)
Dept: MAMMOGRAPHY | Facility: CLINIC | Age: 73
End: 2023-03-17
Payer: COMMERCIAL

## 2023-03-17 NOTE — TELEPHONE ENCOUNTER
Pathology results are back from [facility] and finalized now.  Patient had been informed that it would take longer to process due to being sent to [facility].      Report was reviewed with our Breast Center Radiologist, Dr. Rajan, who confirmed the recent breast imaging is concordant with the final surgical pathology results.    I phoned patient, confirmed her full name, date of birth, and notified patient of the following breast biopsy results:     LEFT BREAST, 2 O'CLOCK POSITION, MID-POSTERIOR, STEREOTACTIC NEEDLE CORE BIOPSY:  -ATYPICAL DUCTAL HYPERPLASIA (THREE SEPARATE FOCI)  -LESION ARISES IN A BACKGROUND OF PROLIFERATIVE FIBROCYSTIC CHANGES  -MICROCALCIFICATIONS ARE PRESENT  -BLACK INK IS CONFIRMED  -NO EVIDENCE OF ATYPIA OR MALIGNANCY      Electronically signed by Phi Akins MD on 3/16/2023 at  5:09 PM        Per Breast Center Radiologist, I have assisted scheduling patient for the following:    Breast Surgeon Consult:  3/21/23 3:00   [facility]  [address], [address]   [place] [address]  [phone]    Patient denies any concerns at her biopsy site.  She has my phone number if she has further questions.  Patient verbalized understanding and agrees with the plan of care.    Ordering provider has been notified of the results and plan.       Glory Sutherland RN, BSN, PHN, CBCN  Imaging Nurse Coordinator  [facility]  [phone]

## 2023-03-21 ENCOUNTER — OFFICE VISIT (OUTPATIENT)
Dept: SURGERY | Facility: CLINIC | Age: 73
End: 2023-03-21
Attending: FAMILY MEDICINE
Payer: COMMERCIAL

## 2023-03-21 VITALS
HEART RATE: 89 BPM | BODY MASS INDEX: 32.2 KG/M2 | DIASTOLIC BLOOD PRESSURE: 88 MMHG | WEIGHT: 175 LBS | SYSTOLIC BLOOD PRESSURE: 142 MMHG | RESPIRATION RATE: 16 BRPM | HEIGHT: 62 IN

## 2023-03-21 DIAGNOSIS — N60.92 ATYPICAL LOBULAR HYPERPLASIA OF LEFT BREAST: Primary | ICD-10-CM

## 2023-03-21 PROCEDURE — 99203 OFFICE O/P NEW LOW 30 MIN: CPT | Performed by: SPECIALIST

## 2023-03-21 PROCEDURE — G0463 HOSPITAL OUTPT CLINIC VISIT: HCPCS | Performed by: SPECIALIST

## 2023-03-21 NOTE — H&P (VIEW-ONLY)
This is a 72 year old woman who I'm asked to see by Jovita Maxwell for evaluation of  left breast lesion.  This was an area of calcifications picked up on mammogram.  She underwent a needle biopsy which shows atypical hyperplasia.  She has no palpable mass that she can feel.  She has no family history of breast cancer.      Past Medical History:  Past Medical History:   Diagnosis Date     Asymptomatic postmenopausal status (age-related) (natural) 2007    age 56     Cervical high risk human papillomavirus (HPV) DNA test positive 12/4/2006    ASCUS Pap, favor benign, HRHPV positive, December 4, 2006     History of blepharoplasty 2/27/2006    Integumentary Prodecures Blepharoplasty     History of pregnancy     NSVDX2     Left radial fracture 09/17/2018    evaluated by orthopedic     Raynaud's syndrome     Created by Conversion      Past Surgical History:   Procedure Laterality Date     BIOPSY BREAST  6/23/08    Description: Biopsy Breast Percutaneous Needle Core;  Proc Date: 06/23/2008; 2 areas on the same day.     TUBAL LIGATION           Current Outpatient Medications:      atorvastatin (LIPITOR) 80 MG tablet, TAKE 1 TABLET BY MOUTH EVERY DAY, Disp: 30 tablet, Rfl: 0     calcium carbonate (OS-ELOISE) 600 mg calcium (1,500 mg) tablet, [CALCIUM CARBONATE (OS-ELOISE) 600 MG CALCIUM (1,500 MG) TABLET] Take 1 tablet (600 mg total) by mouth 2 (two) times a day with meals., Disp: 180 tablet, Rfl: 3     latanoprost (XALATAN) 0.005 % ophthalmic solution, [LATANOPROST (XALATAN) 0.005 % OPHTHALMIC SOLUTION] , Disp: , Rfl:      levothyroxine (SYNTHROID/LEVOTHROID) 112 MCG tablet, Take 1 tablet (112 mcg) by mouth daily, Disp: 90 tablet, Rfl: 3     timolol maleate (TIMOPTIC) 0.5 % ophthalmic solution, [TIMOLOL MALEATE (TIMOPTIC) 0.5 % OPHTHALMIC SOLUTION] 1 drop daily in each eye, Disp: 10 mL, Rfl: 12    No Known Allergies    Social History     Tobacco Use     Smoking status: Former     Packs/day: 1.00     Years: 37.00     Pack years: 37.00  "    Types: Cigarettes     Quit date: 2005     Years since quittin.2     Smokeless tobacco: Never     Tobacco comments:     No exposure   Substance Use Topics     Alcohol use: Yes     Alcohol/week: 2.5 standard drinks     Drug use: No       Pertinent items are noted in HPI.    Physical exam:  BP (!) 142/88 (BP Location: Left arm)   Pulse 89   Resp 16   Ht 1.575 m (5' 2\")   Wt 79.4 kg (175 lb)   BMI 32.01 kg/m      General: alert, appears stated age and cooperative  Lungs: Good diaphragmatic excursion. Lungs clear.     CV: regular rate and rhythm  Breast: breasts symmetric, no dominant or suspicious mass or no skin or nipple changes  Axilla: no adenopathy      Imaging: Her mammogram and ultrasound were reviewed.    Impression: Atypical hyperplasia of the left  breast.  Further excision is indicated to rule out DCIS or invasive  carcinoma. Risks and benefits of surgery explained and they wish to proceed.  All questions answered.  Did explain that even if the further excision is negative, just having this is an initial diagnosis does put her at higher risk for developing breast cancer in the future.    Plan: Left breast biopsy with wire localization.  Typically an outpatient procedure under local MAC anesthetic.  Follow-up with me after surgery can be as needed if pathology shows anything of concern or if she has any problems with surgery.  Assuming the pathology is negative, she will still need to continue with yearly mammograms.        "

## 2023-03-21 NOTE — NURSING NOTE
"Mary presents to [facility] today for a surgical consult with Dr. Ba  regarding a left breast lesion.  RN assessment and EMR update. BP (!) 152/103 (BP Location: Right arm)   Pulse 89   Resp 16   Ht 1.575 m (5' 2\")   Wt 79.4 kg (175 lb)   BMI 32.01 kg/m    Patient met with Dr. Ba .  See dictation for details of visit. She will plan wire loc surgical excision of lesion.  Pre and post op teaching, written and verbal, provided to patient.  Walked her to Cherrie/Yeyo for surgery scheduling.  Follow up pending biopsy results.  RN time 20 mins.  "

## 2023-03-21 NOTE — LETTER
3/21/2023         RE: Sarah Beth Aceves  [address] [facility] [address]        Dear Colleague,    Thank you for referring your patient, Sarah Beth Aceves, to the [facility]. Please see a copy of my visit note below.    This is a 72 year old woman who I'm asked to see by Jovita Maxwell for evaluation of  left breast lesion.  This was an area of calcifications picked up on mammogram.  She underwent a needle biopsy which shows atypical hyperplasia.  She has no palpable mass that she can feel.  She has no family history of breast cancer.      Past Medical History:  Past Medical History:   Diagnosis Date     Asymptomatic postmenopausal status (age-related) (natural) 2007    age 56     Cervical high risk human papillomavirus (HPV) DNA test positive 12/4/2006    ASCUS Pap, favor benign, HRHPV positive, December 4, 2006     History of blepharoplasty 2/27/2006    Integumentary Prodecures Blepharoplasty     History of pregnancy     NSVDX2     Left radial fracture 09/17/2018    evaluated by orthopedic     Raynaud's syndrome     Created by Conversion      Past Surgical History:   Procedure Laterality Date     BIOPSY BREAST  6/23/08    Description: Biopsy Breast Percutaneous Needle Core;  Proc Date: 06/23/2008; 2 areas on the same day.     TUBAL LIGATION           Current Outpatient Medications:      atorvastatin (LIPITOR) 80 MG tablet, TAKE 1 TABLET BY MOUTH EVERY DAY, Disp: 30 tablet, Rfl: 0     calcium carbonate (OS-ELOISE) 600 mg calcium (1,500 mg) tablet, [CALCIUM CARBONATE (OS-ELOISE) 600 MG CALCIUM (1,500 MG) TABLET] Take 1 tablet (600 mg total) by mouth 2 (two) times a day with meals., Disp: 180 tablet, Rfl: 3     latanoprost (XALATAN) 0.005 % ophthalmic solution, [LATANOPROST (XALATAN) 0.005 % OPHTHALMIC SOLUTION] , Disp: , Rfl:      levothyroxine (SYNTHROID/LEVOTHROID) 112 MCG tablet, Take 1 tablet (112 mcg) by mouth daily, Disp: 90 tablet, Rfl: 3     timolol maleate (TIMOPTIC) 0.5  "% ophthalmic solution, [TIMOLOL MALEATE (TIMOPTIC) 0.5 % OPHTHALMIC SOLUTION] 1 drop daily in each eye, Disp: 10 mL, Rfl: 12    No Known Allergies    Social History     Tobacco Use     Smoking status: Former     Packs/day: 1.00     Years: 37.00     Pack years: 37.00     Types: Cigarettes     Quit date: 2005     Years since quittin.2     Smokeless tobacco: Never     Tobacco comments:     No exposure   Substance Use Topics     Alcohol use: Yes     Alcohol/week: 2.5 standard drinks     Drug use: No       Pertinent items are noted in HPI.    Physical exam:  BP (!) 142/88 (BP Location: Left arm)   Pulse 89   Resp 16   Ht 1.575 m (5' 2\")   Wt 79.4 kg (175 lb)   BMI 32.01 kg/m      General: alert, appears stated age and cooperative  Lungs: Good diaphragmatic excursion. Lungs clear.     CV: regular rate and rhythm  Breast: breasts symmetric, no dominant or suspicious mass or no skin or nipple changes  Axilla: no adenopathy      Imaging: Her mammogram and ultrasound were reviewed.    Impression: Atypical hyperplasia of the left  breast.  Further excision is indicated to rule out DCIS or invasive  carcinoma. Risks and benefits of surgery explained and they wish to proceed.  All questions answered.  Did explain that even if the further excision is negative, just having this is an initial diagnosis does put her at higher risk for developing breast cancer in the future.    Plan: Left breast biopsy with wire localization.  Typically an outpatient procedure under local MAC anesthetic.  Follow-up with me after surgery can be as needed if pathology shows anything of concern or if she has any problems with surgery.  Assuming the pathology is negative, she will still need to continue with yearly mammograms.            Again, thank you for allowing me to participate in the care of your patient.        Sincerely,        Brittaney Ba MD    "

## 2023-03-23 ENCOUNTER — OFFICE VISIT (OUTPATIENT)
Dept: FAMILY MEDICINE | Facility: CLINIC | Age: 73
End: 2023-03-23
Payer: COMMERCIAL

## 2023-03-23 VITALS
OXYGEN SATURATION: 98 % | TEMPERATURE: 97.9 F | SYSTOLIC BLOOD PRESSURE: 126 MMHG | WEIGHT: 176 LBS | BODY MASS INDEX: 32.39 KG/M2 | RESPIRATION RATE: 16 BRPM | HEIGHT: 62 IN | DIASTOLIC BLOOD PRESSURE: 84 MMHG | HEART RATE: 80 BPM

## 2023-03-23 DIAGNOSIS — E78.5 HYPERLIPIDEMIA, UNSPECIFIED HYPERLIPIDEMIA TYPE: Primary | ICD-10-CM

## 2023-03-23 DIAGNOSIS — E03.9 HYPOTHYROIDISM, UNSPECIFIED TYPE: ICD-10-CM

## 2023-03-23 PROBLEM — H25.9 AGE-RELATED CATARACT OF BOTH EYES, UNSPECIFIED AGE-RELATED CATARACT TYPE: Status: RESOLVED | Noted: 2021-08-10 | Resolved: 2023-03-23

## 2023-03-23 PROBLEM — M85.89 OSTEOPENIA OF MULTIPLE SITES: Status: RESOLVED | Noted: 2022-08-19 | Resolved: 2023-03-23

## 2023-03-23 PROBLEM — F32.9 REACTIVE DEPRESSION: Status: RESOLVED | Noted: 2020-11-06 | Resolved: 2023-03-23

## 2023-03-23 PROBLEM — Z87.891 FORMER CIGARETTE SMOKER: Status: RESOLVED | Noted: 2022-08-19 | Resolved: 2023-03-23

## 2023-03-23 LAB
CHOLEST SERPL-MCNC: 196 MG/DL
HDLC SERPL-MCNC: 60 MG/DL
LDLC SERPL CALC-MCNC: 106 MG/DL
NONHDLC SERPL-MCNC: 136 MG/DL
TRIGL SERPL-MCNC: 152 MG/DL
TSH SERPL DL<=0.005 MIU/L-ACNC: 0.67 UIU/ML (ref 0.3–4.2)

## 2023-03-23 PROCEDURE — 36415 COLL VENOUS BLD VENIPUNCTURE: CPT | Performed by: FAMILY MEDICINE

## 2023-03-23 PROCEDURE — 99214 OFFICE O/P EST MOD 30 MIN: CPT | Performed by: FAMILY MEDICINE

## 2023-03-23 PROCEDURE — 84443 ASSAY THYROID STIM HORMONE: CPT | Performed by: FAMILY MEDICINE

## 2023-03-23 PROCEDURE — 80061 LIPID PANEL: CPT | Performed by: FAMILY MEDICINE

## 2023-03-23 RX ORDER — ATORVASTATIN CALCIUM 80 MG/1
80 TABLET, FILM COATED ORAL AT BEDTIME
Qty: 90 TABLET | Refills: 3 | Status: SHIPPED | OUTPATIENT
Start: 2023-03-23 | End: 2023-10-04

## 2023-03-23 RX ORDER — LEVOTHYROXINE SODIUM 112 UG/1
112 TABLET ORAL
Qty: 90 TABLET | Refills: 3 | Status: SHIPPED | OUTPATIENT
Start: 2023-03-23 | End: 2023-10-04

## 2023-03-23 ASSESSMENT — PATIENT HEALTH QUESTIONNAIRE - PHQ9
10. IF YOU CHECKED OFF ANY PROBLEMS, HOW DIFFICULT HAVE THESE PROBLEMS MADE IT FOR YOU TO DO YOUR WORK, TAKE CARE OF THINGS AT HOME, OR GET ALONG WITH OTHER PEOPLE: NOT DIFFICULT AT ALL
SUM OF ALL RESPONSES TO PHQ QUESTIONS 1-9: 1
SUM OF ALL RESPONSES TO PHQ QUESTIONS 1-9: 1

## 2023-03-23 ASSESSMENT — PAIN SCALES - GENERAL: PAINLEVEL: NO PAIN (0)

## 2023-03-23 NOTE — PROGRESS NOTES
"  Assessment & Plan     (E78.5) Hyperlipidemia, unspecified hyperlipidemia type  (primary encounter diagnosis)  Comment: pt doing well with medication. We reviewed her cholesterol 7 month ago. She had elevated triglycerides and LDL and improved compared to one year ago.   Plan: atorvastatin (LIPITOR) 80 MG tablet, Lipid         panel reflex to direct LDL Fasting        Advise regular exercise and low fat diet. Continue current medication.     (E03.9) Hypothyroidism, unspecified type  Comment: doing well. She is asymptomatic. She has been stable for years.   Plan: levothyroxine (SYNTHROID/LEVOTHROID) 112 MCG         tablet, TSH with free T4 reflex        Continue current medication.         BMI:   Estimated body mass index is 32.19 kg/m  as calculated from the following:    Height as of this encounter: 1.575 m (5' 2\").    Weight as of this encounter: 79.8 kg (176 lb).   Weight management plan: Discussed healthy diet and exercise guidelines    See Patient Instructions    Jovita Maxwell MD  [facility]    Beth Hernandez is a 72 year old, presenting for the following health issues:  Establish Care and Recheck Medication (Med check and refills fasting )    Additional Questions 3/23/2023   Roomed by paola garcia cma     History of Present Illness       Reason for visit:  New patient visit    She eats 2-3 servings of fruits and vegetables daily.She consumes 0 sweetened beverage(s) daily.She exercises with enough effort to increase her heart rate 20 to 29 minutes per day.  She exercises with enough effort to increase her heart rate 4 days per week.   She is taking medications regularly.    Today's PHQ-9         PHQ-9 Total Score: 1    PHQ-9 Q9 Thoughts of better off dead/self-harm past 2 weeks :   Not at all    How difficult have these problems made it for you to do your work, take care of things at home, or get along with other people: Not difficult at all       Hyperlipidemia Follow-Up      Are you " "regularly taking any medication or supplement to lower your cholesterol?   Yes- lipitor    Are you having muscle aches or other side effects that you think could be caused by your cholesterol lowering medication?  No    Hypothyroidism Follow-up      Since last visit, patient describes the following symptoms: Weight stable, no hair loss, no skin changes, no constipation, no loose stools          Review of Systems   Constitutional, HEENT, cardiovascular, pulmonary, gi and gu systems are negative, except as otherwise noted.      Objective    /86   Temp 97.9  F (36.6  C)   Resp 16   Ht 1.575 m (5' 2\")   Wt 79.8 kg (176 lb)   BMI 32.19 kg/m    Body mass index is 32.19 kg/m .  Physical Exam   GENERAL: healthy, alert and no distress  NECK: no adenopathy, no asymmetry, masses, or scars and thyroid normal to palpation  RESP: lungs clear to auscultation - no rales, rhonchi or wheezes  CV: regular rate and rhythm, normal S1 S2, no S3 or S4, no murmur, click or rub, no peripheral edema and peripheral pulses strong  ABDOMEN: soft, nontender, no hepatosplenomegaly, no masses and bowel sounds normal  MS: no gross musculoskeletal defects noted, no edema               "

## 2023-03-28 ENCOUNTER — ANESTHESIA EVENT (OUTPATIENT)
Dept: SURGERY | Facility: AMBULATORY SURGERY CENTER | Age: 73
End: 2023-03-28
Payer: COMMERCIAL

## 2023-03-28 NOTE — ANESTHESIA PREPROCEDURE EVALUATION
Anesthesia Pre-Procedure Evaluation    Patient: Sarah Beth Aceves   MRN: [id number] : 1950        Procedure : Procedure(s):  Left Breast Biopsy after Wire Localization          Past Medical History:   Diagnosis Date     Asymptomatic postmenopausal status (age-related) (natural) 2007    age 56     Cervical high risk human papillomavirus (HPV) DNA test positive 2006    ASCUS Pap, favor benign, HRHPV positive, 2006     History of blepharoplasty 2006    Integumentary Prodecures Blepharoplasty     History of pregnancy     NSVDX2     Left radial fracture 2018    evaluated by orthopedic     Raynaud's syndrome     Created by Conversion      Thyroid disease       Past Surgical History:   Procedure Laterality Date     BIOPSY BREAST  08    Description: Biopsy Breast Percutaneous Needle Core;  Proc Date: 2008; 2 areas on the same day.     TUBAL LIGATION        No Known Allergies   Social History     Tobacco Use     Smoking status: Former     Packs/day: 1.00     Years: 37.00     Pack years: 37.00     Types: Cigarettes     Quit date: 2005     Years since quittin.2     Smokeless tobacco: Never     Tobacco comments:     No exposure   Substance Use Topics     Alcohol use: Yes     Alcohol/week: 2.5 standard drinks      Wt Readings from Last 1 Encounters:   23 79.8 kg (176 lb)        Anesthesia Evaluation   Pt has had prior anesthetic.     No history of anesthetic complications       ROS/MED HX  ENT/Pulmonary:  - neg pulmonary ROS     Neurologic:  - neg neurologic ROS     Cardiovascular:     (+) hypertension-----    METS/Exercise Tolerance:     Hematologic:  - neg hematologic  ROS     Musculoskeletal:  - neg musculoskeletal ROS     GI/Hepatic:  - neg GI/hepatic ROS  (-) GERD   Renal/Genitourinary:       Endo:     (+) thyroid problem, Obesity (BMI 32),     Psychiatric/Substance Use:     (+) psychiatric history anxiety     Infectious Disease:       Malignancy:  Comment: ADH s/f Left Breast Biopsy after Wire Localization (Left: Breast)      Other:            Physical Exam    Airway        Mallampati: II   TM distance: > 3 FB   Neck ROM: full   Mouth opening: > 3 cm    Respiratory Devices and Support         Dental       (+) Minor Abnormalities - some fillings, tiny chips      Cardiovascular   cardiovascular exam normal          Pulmonary   pulmonary exam normal                OUTSIDE LABS:  CBC:   Lab Results   Component Value Date    HGB 13.7 08/19/2022    HGB 13.8 11/06/2020     BMP:   Lab Results   Component Value Date     08/19/2022     08/10/2021    POTASSIUM 4.9 08/19/2022    POTASSIUM 4.7 08/10/2021    CHLORIDE 101 08/19/2022    CHLORIDE 103 08/10/2021    CO2 29 08/19/2022    CO2 28 08/10/2021    BUN 7.6 (L) 08/19/2022    BUN 12 08/10/2021    CR 0.65 08/19/2022    CR 0.77 08/10/2021     (H) 08/19/2022     08/10/2021     COAGS: No results found for: PTT, INR, FIBR  POC: No results found for: BGM, HCG, HCGS  HEPATIC:   Lab Results   Component Value Date    ALBUMIN 4.3 09/07/2018    PROTTOTAL 7.6 09/07/2018    ALT 19 09/07/2018    AST 24 09/07/2018    ALKPHOS 113 09/07/2018    BILITOTAL 0.6 09/07/2018     OTHER:   Lab Results   Component Value Date    ELOISE 10.4 (H) 08/19/2022    TSH 0.67 03/23/2023       Anesthesia Plan    ASA Status:  2   NPO Status:  NPO Appropriate    Anesthesia Type: MAC.     - Reason for MAC: straight local not clinically adequate   Induction: Intravenous, Propofol.   Maintenance: TIVA.        Consents    Anesthesia Plan(s) and associated risks, benefits, and realistic alternatives discussed. Questions answered and patient/representative(s) expressed understanding.    - Discussed:     - Discussed with:  Patient         Postoperative Care    Pain management: IV analgesics, Multi-modal analgesia.   PONV prophylaxis: Background Propofol Infusion, Dexamethasone or Solumedrol, Ondansetron (or other 5HT-3)     Comments:    Other  Comments: Plan for propofol gtt w/ fentanyl PRN for pain.  Discussed potential need to convert to GA w/ ETT vs LMA if not tolerating.  Explained that it is possible to remember certain aspects of the OR experience during MAC dependent on the depth of sedation and patient understands this.  Decadron and zofran for PONV ppx.            Hilda Agudelo MD

## 2023-03-29 ENCOUNTER — ANCILLARY PROCEDURE (OUTPATIENT)
Dept: MAMMOGRAPHY | Facility: CLINIC | Age: 73
End: 2023-03-29
Attending: SPECIALIST
Payer: COMMERCIAL

## 2023-03-29 ENCOUNTER — ANESTHESIA (OUTPATIENT)
Dept: SURGERY | Facility: AMBULATORY SURGERY CENTER | Age: 73
End: 2023-03-29
Payer: COMMERCIAL

## 2023-03-29 ENCOUNTER — HOSPITAL ENCOUNTER (OUTPATIENT)
Facility: AMBULATORY SURGERY CENTER | Age: 73
Discharge: HOME | End: 2023-03-29
Attending: SPECIALIST
Payer: COMMERCIAL

## 2023-03-29 VITALS
RESPIRATION RATE: 16 BRPM | DIASTOLIC BLOOD PRESSURE: 78 MMHG | OXYGEN SATURATION: 97 % | HEIGHT: 62 IN | TEMPERATURE: 96.5 F | BODY MASS INDEX: 32.39 KG/M2 | HEART RATE: 91 BPM | WEIGHT: 176 LBS | SYSTOLIC BLOOD PRESSURE: 132 MMHG

## 2023-03-29 DIAGNOSIS — N60.92 ATYPICAL LOBULAR HYPERPLASIA OF LEFT BREAST: ICD-10-CM

## 2023-03-29 PROCEDURE — 250N000009 HC RX 250: Performed by: SPECIALIST

## 2023-03-29 PROCEDURE — 272N000431 US BREAST WIRE PLACEMENT 1ST LESION LEFT

## 2023-03-29 PROCEDURE — 19125 EXCISION BREAST LESION: CPT | Mod: LT | Performed by: SPECIALIST

## 2023-03-29 PROCEDURE — 999N000065 MA POST PROCEDURE LEFT

## 2023-03-29 RX ORDER — LIDOCAINE 40 MG/G
CREAM TOPICAL
Status: DISCONTINUED | OUTPATIENT
Start: 2023-03-29 | End: 2023-03-30 | Stop reason: HOSPADM

## 2023-03-29 RX ORDER — HYDROCODONE BITARTRATE AND ACETAMINOPHEN 5; 325 MG/1; MG/1
1 TABLET ORAL EVERY 6 HOURS PRN
Qty: 10 TABLET | Refills: 0 | Status: SHIPPED | OUTPATIENT
Start: 2023-03-29 | End: 2023-04-07

## 2023-03-29 RX ORDER — ONDANSETRON 2 MG/ML
INJECTION INTRAMUSCULAR; INTRAVENOUS PRN
Status: DISCONTINUED | OUTPATIENT
Start: 2023-03-29 | End: 2023-03-29

## 2023-03-29 RX ORDER — SODIUM CHLORIDE, SODIUM LACTATE, POTASSIUM CHLORIDE, CALCIUM CHLORIDE 600; 310; 30; 20 MG/100ML; MG/100ML; MG/100ML; MG/100ML
INJECTION, SOLUTION INTRAVENOUS CONTINUOUS
Status: DISCONTINUED | OUTPATIENT
Start: 2023-03-29 | End: 2023-03-30 | Stop reason: HOSPADM

## 2023-03-29 RX ORDER — PROPOFOL 10 MG/ML
INJECTION, EMULSION INTRAVENOUS CONTINUOUS PRN
Status: DISCONTINUED | OUTPATIENT
Start: 2023-03-29 | End: 2023-03-29

## 2023-03-29 RX ORDER — PROPOFOL 10 MG/ML
INJECTION, EMULSION INTRAVENOUS PRN
Status: DISCONTINUED | OUTPATIENT
Start: 2023-03-29 | End: 2023-03-29

## 2023-03-29 RX ORDER — LIDOCAINE HYDROCHLORIDE 20 MG/ML
INJECTION, SOLUTION INFILTRATION; PERINEURAL PRN
Status: DISCONTINUED | OUTPATIENT
Start: 2023-03-29 | End: 2023-03-29

## 2023-03-29 RX ORDER — IBUPROFEN 600 MG/1
600 TABLET, FILM COATED ORAL
Status: CANCELLED | OUTPATIENT
Start: 2023-03-29

## 2023-03-29 RX ORDER — GLYCOPYRROLATE 0.2 MG/ML
INJECTION, SOLUTION INTRAMUSCULAR; INTRAVENOUS PRN
Status: DISCONTINUED | OUTPATIENT
Start: 2023-03-29 | End: 2023-03-29

## 2023-03-29 RX ORDER — FENTANYL CITRATE 50 UG/ML
INJECTION, SOLUTION INTRAMUSCULAR; INTRAVENOUS PRN
Status: DISCONTINUED | OUTPATIENT
Start: 2023-03-29 | End: 2023-03-29

## 2023-03-29 RX ORDER — OXYCODONE HYDROCHLORIDE 10 MG/1
10 TABLET ORAL
Status: DISCONTINUED | OUTPATIENT
Start: 2023-03-29 | End: 2023-03-30 | Stop reason: HOSPADM

## 2023-03-29 RX ORDER — HYDROCODONE BITARTRATE AND ACETAMINOPHEN 5; 325 MG/1; MG/1
1 TABLET ORAL
Status: CANCELLED | OUTPATIENT
Start: 2023-03-29

## 2023-03-29 RX ORDER — FENTANYL CITRATE 0.05 MG/ML
25 INJECTION, SOLUTION INTRAMUSCULAR; INTRAVENOUS
Status: DISCONTINUED | OUTPATIENT
Start: 2023-03-29 | End: 2023-03-30 | Stop reason: HOSPADM

## 2023-03-29 RX ORDER — BUPIVACAINE HYDROCHLORIDE 2.5 MG/ML
INJECTION, SOLUTION INFILTRATION; PERINEURAL PRN
Status: DISCONTINUED | OUTPATIENT
Start: 2023-03-29 | End: 2023-03-29 | Stop reason: HOSPADM

## 2023-03-29 RX ORDER — OXYCODONE HYDROCHLORIDE 5 MG/1
5 TABLET ORAL
Status: DISCONTINUED | OUTPATIENT
Start: 2023-03-29 | End: 2023-03-30 | Stop reason: HOSPADM

## 2023-03-29 RX ADMIN — PROPOFOL 30 MG: 10 INJECTION, EMULSION INTRAVENOUS at 12:15

## 2023-03-29 RX ADMIN — LIDOCAINE HYDROCHLORIDE 40 MG: 20 INJECTION, SOLUTION INFILTRATION; PERINEURAL at 12:15

## 2023-03-29 RX ADMIN — PROPOFOL 160 MCG/KG/MIN: 10 INJECTION, EMULSION INTRAVENOUS at 12:16

## 2023-03-29 RX ADMIN — SODIUM CHLORIDE, SODIUM LACTATE, POTASSIUM CHLORIDE, CALCIUM CHLORIDE: 600; 310; 30; 20 INJECTION, SOLUTION INTRAVENOUS at 11:18

## 2023-03-29 RX ADMIN — GLYCOPYRROLATE 0.2 MG: 0.2 INJECTION, SOLUTION INTRAMUSCULAR; INTRAVENOUS at 12:23

## 2023-03-29 RX ADMIN — ONDANSETRON 4 MG: 2 INJECTION INTRAMUSCULAR; INTRAVENOUS at 12:15

## 2023-03-29 RX ADMIN — LIDOCAINE HYDROCHLORIDE 10 ML: 10 INJECTION, SOLUTION INFILTRATION; PERINEURAL at 10:38

## 2023-03-29 RX ADMIN — FENTANYL CITRATE 25 MCG: 50 INJECTION, SOLUTION INTRAMUSCULAR; INTRAVENOUS at 12:24

## 2023-03-29 NOTE — ANESTHESIA POSTPROCEDURE EVALUATION
Patient: Saarh Beth Aceves    Procedure: Procedure(s):  Left Breast Biopsy after Wire Localization       Anesthesia Type:  MAC    Note:  Disposition: Outpatient   Postop Pain Control: Uneventful            Sign Out: Well controlled pain   PONV: No   Neuro/Psych: Uneventful            Sign Out: Acceptable/Baseline neuro status   Airway/Respiratory: Uneventful            Sign Out: Acceptable/Baseline resp. status   CV/Hemodynamics: Uneventful            Sign Out: Acceptable CV status; No obvious hypovolemia; No obvious fluid overload   Other NRE: NONE   DID A NON-ROUTINE EVENT OCCUR?            Last vitals:  Vitals Value Taken Time   /86 03/29/23 1316   Temp 96.5  F (35.8  C) 03/29/23 1248   Pulse 84 03/29/23 1319   Resp 16 03/29/23 1300   SpO2 94 % 03/29/23 1319   Vitals shown include unvalidated device data.    Electronically Signed By: Hilda Agudelo MD  March 29, 2023  2:39 PM

## 2023-03-29 NOTE — DISCHARGE INSTRUCTIONS
If you have any questions or concerns regarding your procedure, please contact Dr. Ba, her office number is [phone].    Take tylenol and ibuprofen every 6 hours as your main form of pain control.     Acetaminophen (Tylenol): Next Dose: ANYTIME. Take 650-1000 mg every 6 hours for mild to moderate pain. Do not take if taking Norco.    Ibuprofen (Motrin, Advil): Next Dose: ANYTIME. Take 600 mg every 6 hours for mild to moderate pain.    Do not exceed 4,000 mg of acetaminophen during a 24 hour period  or 1000 mg per dose. Keep in mind that acetaminophen can also be found in many over-the-counter cold medications as well as narcotics that may be given for pain.

## 2023-03-29 NOTE — INTERVAL H&P NOTE
"I have reviewed the surgical (or preoperative) H&P that is linked to this encounter, and examined the patient. There are no significant changes    Clinical Conditions Present on Arrival:  Clinically Significant Risk Factors Present on Admission                    # Obesity: Estimated body mass index is 32.19 kg/m  as calculated from the following:    Height as of this encounter: 1.575 m (5' 2\").    Weight as of this encounter: 79.8 kg (176 lb).       "

## 2023-03-29 NOTE — ANESTHESIA CARE TRANSFER NOTE
Patient: Sarah Beth Aceves    Procedure: Procedure(s):  Left Breast Biopsy after Wire Localization       Diagnosis: Atypical lobular hyperplasia of left breast [N60.92]  Diagnosis Additional Information: No value filed.    Anesthesia Type:   MAC     Note:    Oropharynx: oropharynx clear of all foreign objects and spontaneously breathing  Level of Consciousness: awake  Oxygen Supplementation: room air    Independent Airway: airway patency satisfactory and stable  Dentition: dentition unchanged  Vital Signs Stable: post-procedure vital signs reviewed and stable  Report to RN Given: handoff report given  Patient transferred to: Phase II    Handoff Report: Identifed the Patient, Identified the Reponsible Provider, Reviewed the pertinent medical history, Discussed the surgical course, Reviewed Intra-OP anesthesia mangement and issues during anesthesia, Set expectations for post-procedure period and Allowed opportunity for questions and acknowledgement of understanding      Vitals:  Vitals Value Taken Time   /72 03/29/23 1249   Temp 96.5  F (35.8  C) 03/29/23 1248   Pulse 98 03/29/23 1249   Resp 16 03/29/23 1248   SpO2 95 % 03/29/23 1249   Vitals shown include unvalidated device data.    Electronically Signed By: ELENITA Schaefer CRNA  March 29, 2023  12:51 PM

## 2023-03-29 NOTE — OP NOTE
Operative Note    Name:  Sarah Beth Aceves  PCP:  Jovita Maxwell  Procedure Date:  3/29/2023       Procedure:  Procedure(s):  Left Breast Biopsy after Wire Localization     Pre-Procedure Diagnosis:  Atypical lobular hyperplasia of left breast [N60.92]     Post-Procedure Diagnosis:    Same     Surgeon(s):  Brittaney Ba MD     Assistant: None      Anesthesia Type:  MAC       Findings:  Clip visible as I removed the specimen.     Operative Report:    The patient was brought to the operating suite where she was placed in the supine position.  She was prepped and draped in a sterile fashion.  After infiltration with 1% lidocaine and quarter percent Marcaine  a curvilinear  incision was made adjacent to the wire in the upper outer  left breast .  This is carried down to the crimp in the wire and the widely around the end of the wire  with electrocautery.  As I was coming down to that area I could actually visibly see the clip so backed up and went around it wider so that this anterior margin would be wider.  It was removed marked and sent  to pathology. The wound was inspected for hemostasis and closed with 3-0 Vicryl to the subcutaneous tissues and a subcuticular suture for Monocryl to the skin.  Dressings were placed.  The patient procedure well.    Estimated Blood Loss:   5cc    Specimens:    ID Type Source Tests Collected by Time Destination   1 :  Lumpectomy Breast, Left SURGICAL PATHOLOGY EXAM Brittaney Ba MD 3/29/2023 12:33 PM            Drains:        Complications:    None    Brittaney Ba MD     Date: 3/29/2023  Time: 12:48 PM

## 2023-04-05 ENCOUNTER — TRANSFERRED RECORDS (OUTPATIENT)
Dept: HEALTH INFORMATION MANAGEMENT | Facility: CLINIC | Age: 73
End: 2023-04-05
Payer: COMMERCIAL

## 2023-04-07 ENCOUNTER — OFFICE VISIT (OUTPATIENT)
Dept: SURGERY | Facility: CLINIC | Age: 73
End: 2023-04-07
Attending: SPECIALIST
Payer: COMMERCIAL

## 2023-04-07 DIAGNOSIS — N60.92 ATYPICAL DUCTAL HYPERPLASIA OF LEFT BREAST: ICD-10-CM

## 2023-04-07 DIAGNOSIS — N60.92 ATYPICAL LOBULAR HYPERPLASIA OF LEFT BREAST: Primary | ICD-10-CM

## 2023-04-07 PROCEDURE — 99024 POSTOP FOLLOW-UP VISIT: CPT | Performed by: SPECIALIST

## 2023-04-07 PROCEDURE — G0463 HOSPITAL OUTPT CLINIC VISIT: HCPCS | Performed by: SPECIALIST

## 2023-04-07 NOTE — PROGRESS NOTES
In for follow-up of her left breast biopsy. She is feeling well.  She is having very minimal pain.      Physical exam:  Appears well.  Does not appear in any discomfort.  Breasts: Incision is healing nicely with no signs of infection.  No swelling.    Pathology: Flat atypia and atypical ductal hyperplasia.    Impression: Postop visit. Doing well.  No evidence of malignancy.  I recommend that Mary continue with yearly mammograms.  Did warn her that she is at somewhat higher risk for developing breast cancer in the future.    Plan: Continue with yearly mammograms.  Follow-up with me as needed.

## 2023-04-07 NOTE — LETTER
4/7/2023         RE: Sarah Beth Aceves  [address]  [facility] [address]        Dear Colleague,    Thank you for referring your patient, Sarah Beth Aceves, to the [facility] [place]. Please see a copy of my visit note below.    In for follow-up of her left breast biopsy. She is feeling well.  She is having very minimal pain.      Physical exam:  Appears well.  Does not appear in any discomfort.  Breasts: Incision is healing nicely with no signs of infection.  No swelling.    Pathology: Flat atypia and atypical ductal hyperplasia.    Impression: Postop visit. Doing well.  No evidence of malignancy.  I recommend that Mary continue with yearly mammograms.  Did warn her that she is at somewhat higher risk for developing breast cancer in the future.    Plan: Continue with yearly mammograms.  Follow-up with me as needed.        Again, thank you for allowing me to participate in the care of your patient.        Sincerely,        Brittaney Ba MD

## 2023-04-07 NOTE — NURSING NOTE
Mary presents to [facility] for a post op appointment with Dr. Ba .  She isaccompanied by herself for appointment.  She states she is doing well, minimal pain.  She has good ROM, reviewed ROM exercises with her.  Patient met with Dr. aB .  See dictation for details of visit and follow up plan.  RN time 10 mins.

## 2023-07-20 ENCOUNTER — PATIENT OUTREACH (OUTPATIENT)
Dept: CARE COORDINATION | Facility: CLINIC | Age: 73
End: 2023-07-20
Payer: COMMERCIAL

## 2023-08-03 ENCOUNTER — PATIENT OUTREACH (OUTPATIENT)
Dept: CARE COORDINATION | Facility: CLINIC | Age: 73
End: 2023-08-03
Payer: COMMERCIAL

## 2023-09-27 ASSESSMENT — ENCOUNTER SYMPTOMS
WEAKNESS: 0
HEARTBURN: 0
MYALGIAS: 0
COUGH: 0
ARTHRALGIAS: 0
DYSURIA: 0
DIZZINESS: 0
FREQUENCY: 0
BREAST MASS: 0
CHILLS: 0
NAUSEA: 0
NERVOUS/ANXIOUS: 0
DIARRHEA: 0
ABDOMINAL PAIN: 0
EYE PAIN: 0
SORE THROAT: 0
JOINT SWELLING: 0
SHORTNESS OF BREATH: 0
PARESTHESIAS: 0
HEMATURIA: 0
PALPITATIONS: 0
HEMATOCHEZIA: 0
CONSTIPATION: 0
FEVER: 0
HEADACHES: 0

## 2023-09-27 ASSESSMENT — ACTIVITIES OF DAILY LIVING (ADL)
CURRENT_FUNCTION: TELEPHONE REQUIRES ASSISTANCE
CURRENT_FUNCTION: NO ASSISTANCE NEEDED

## 2023-10-04 ENCOUNTER — OFFICE VISIT (OUTPATIENT)
Dept: FAMILY MEDICINE | Facility: CLINIC | Age: 73
End: 2023-10-04
Payer: COMMERCIAL

## 2023-10-04 VITALS
WEIGHT: 167 LBS | OXYGEN SATURATION: 97 % | BODY MASS INDEX: 30.73 KG/M2 | TEMPERATURE: 98.1 F | SYSTOLIC BLOOD PRESSURE: 122 MMHG | DIASTOLIC BLOOD PRESSURE: 84 MMHG | HEART RATE: 73 BPM | HEIGHT: 62 IN | RESPIRATION RATE: 14 BRPM

## 2023-10-04 DIAGNOSIS — E03.9 HYPOTHYROIDISM, UNSPECIFIED TYPE: ICD-10-CM

## 2023-10-04 DIAGNOSIS — E78.5 HYPERLIPIDEMIA, UNSPECIFIED HYPERLIPIDEMIA TYPE: ICD-10-CM

## 2023-10-04 DIAGNOSIS — Z00.00 ENCOUNTER FOR MEDICARE ANNUAL WELLNESS EXAM: Primary | ICD-10-CM

## 2023-10-04 DIAGNOSIS — L98.9 SKIN LESION: ICD-10-CM

## 2023-10-04 LAB
ERYTHROCYTE [DISTWIDTH] IN BLOOD BY AUTOMATED COUNT: 13.9 % (ref 10–15)
HCT VFR BLD AUTO: 41.1 % (ref 35–47)
HGB BLD-MCNC: 13.8 G/DL (ref 11.7–15.7)
MCH RBC QN AUTO: 34.9 PG (ref 26.5–33)
MCHC RBC AUTO-ENTMCNC: 33.6 G/DL (ref 31.5–36.5)
MCV RBC AUTO: 104 FL (ref 78–100)
PLATELET # BLD AUTO: 220 10E3/UL (ref 150–450)
RBC # BLD AUTO: 3.95 10E6/UL (ref 3.8–5.2)
WBC # BLD AUTO: 7.2 10E3/UL (ref 4–11)

## 2023-10-04 PROCEDURE — 99214 OFFICE O/P EST MOD 30 MIN: CPT | Mod: 25 | Performed by: FAMILY MEDICINE

## 2023-10-04 PROCEDURE — 85027 COMPLETE CBC AUTOMATED: CPT | Performed by: FAMILY MEDICINE

## 2023-10-04 PROCEDURE — 80053 COMPREHEN METABOLIC PANEL: CPT | Performed by: FAMILY MEDICINE

## 2023-10-04 PROCEDURE — G0439 PPPS, SUBSEQ VISIT: HCPCS | Performed by: FAMILY MEDICINE

## 2023-10-04 PROCEDURE — 84443 ASSAY THYROID STIM HORMONE: CPT | Performed by: FAMILY MEDICINE

## 2023-10-04 PROCEDURE — 80061 LIPID PANEL: CPT | Performed by: FAMILY MEDICINE

## 2023-10-04 PROCEDURE — 36415 COLL VENOUS BLD VENIPUNCTURE: CPT | Performed by: FAMILY MEDICINE

## 2023-10-04 RX ORDER — LEVOTHYROXINE SODIUM 112 UG/1
112 TABLET ORAL
Qty: 90 TABLET | Refills: 3 | Status: SHIPPED | OUTPATIENT
Start: 2023-10-04 | End: 2024-09-19

## 2023-10-04 RX ORDER — ATORVASTATIN CALCIUM 80 MG/1
80 TABLET, FILM COATED ORAL AT BEDTIME
Qty: 90 TABLET | Refills: 3 | Status: SHIPPED | OUTPATIENT
Start: 2023-10-04 | End: 2024-09-19

## 2023-10-04 ASSESSMENT — ENCOUNTER SYMPTOMS
HEMATURIA: 0
NERVOUS/ANXIOUS: 0
JOINT SWELLING: 0
ABDOMINAL PAIN: 0
HEARTBURN: 0
ARTHRALGIAS: 0
EYE PAIN: 0
SHORTNESS OF BREATH: 0
FREQUENCY: 0
DIZZINESS: 0
DIARRHEA: 0
SORE THROAT: 0
CONSTIPATION: 0
HEMATOCHEZIA: 0
PARESTHESIAS: 0
DYSURIA: 0
FEVER: 0
COUGH: 0
PALPITATIONS: 0
WEAKNESS: 0
HEADACHES: 0
BREAST MASS: 0
NAUSEA: 0
MYALGIAS: 0
CHILLS: 0

## 2023-10-04 ASSESSMENT — PATIENT HEALTH QUESTIONNAIRE - PHQ9
SUM OF ALL RESPONSES TO PHQ QUESTIONS 1-9: 2
SUM OF ALL RESPONSES TO PHQ QUESTIONS 1-9: 2
10. IF YOU CHECKED OFF ANY PROBLEMS, HOW DIFFICULT HAVE THESE PROBLEMS MADE IT FOR YOU TO DO YOUR WORK, TAKE CARE OF THINGS AT HOME, OR GET ALONG WITH OTHER PEOPLE: NOT DIFFICULT AT ALL

## 2023-10-04 ASSESSMENT — ACTIVITIES OF DAILY LIVING (ADL)
CURRENT_FUNCTION: TELEPHONE REQUIRES ASSISTANCE
CURRENT_FUNCTION: NO ASSISTANCE NEEDED

## 2023-10-04 NOTE — PATIENT INSTRUCTIONS
Patient Education   Personalized Prevention Plan  You are due for the preventive services outlined below.  Your care team is available to assist you in scheduling these services.  If you have already completed any of these items, please share that information with your care team to update in your medical record.  Health Maintenance Due   Topic Date Due     Annual Wellness Visit  08/19/2023     Flu Vaccine (1) 09/01/2023     COVID-19 Vaccine (7 - 2023-24 season) 09/01/2023     Learning About Dietary Guidelines  What are the Dietary Guidelines for Americans?     Dietary Guidelines for Americans provide tips for eating well and staying healthy. This helps reduce the risk for long-term (chronic) diseases.  These guidelines recommend that you:  Eat and drink the right amount for you. The U.[place] government's food guide is called MyPlate. It can help you make your own well-balanced eating plan.  Try to balance your eating with your activity. This helps you stay at a healthy weight.  Drink alcohol in moderation, if at all.  Limit foods high in salt, saturated fat, trans fat, and added sugar.  These guidelines are from the U.S. Department of Agriculture and the U.S. Department of Health and Human Services. They are updated every 5 years.  What is MyPlate?  MyPlate is the U.S. government's food guide. It can help you make your own well-balanced eating plan. A balanced eating plan means that you eat enough, but not too much, and that your food gives you the nutrients you need to stay healthy.  MyPlate focuses on eating plenty of whole grains, fruits, and vegetables, and on limiting fat and sugar. It is available online at www.ChooseMyPlate.gov.  How can you get started?  If you're trying to eat healthier, you can slowly change your eating habits over time. You don't have to make big changes all at once. Start by adding one or two healthy foods to your meals each day.  Grains  Choose whole-grain breads and cereals and whole-wheat  "pasta and whole-grain crackers.  Vegetables  Eat a variety of vegetables every day. They have lots of nutrients and are part of a heart-healthy diet.  Fruits  Eat a variety of fruits every day. Fruits contain lots of nutrients. Choose fresh fruit instead of fruit juice.  Protein foods  Choose fish and lean poultry more often. Eat red meat and fried meats less often. Dried beans, tofu, and nuts are also good sources of protein.  Dairy  Choose low-fat or fat-free products from this food group. If you have problems digesting milk, try eating cheese or yogurt instead.  Fats and oils  Limit fats and oils if you're trying to cut calories. Choose healthy fats when you cook. These include canola oil and olive oil.  Where can you learn more?  Go to https://www.[employer].net/patiented  Enter D676 in the search box to learn more about \"Learning About Dietary Guidelines.\"  Current as of: March 1, 2023               Content Version: 13.7    0596-8839 [employer].   Care instructions adapted under license by your healthcare professional. If you have questions about a medical condition or this instruction, always ask your healthcare professional. [employer] disclaims any warranty or liability for your use of this information.      Activities of Daily Living    Your Health Risk Assessment indicates you have difficulties with activities of daily living such as housework, bathing, preparing meals, taking medication, etc. Please make a follow up appointment for us to address this issue in more detail.     "

## 2023-10-04 NOTE — PROGRESS NOTES
"The patient was counseled and encouraged to consider modifying their diet and eating habits. She was provided with information on recommended healthy diet options.  The patient reports that she has difficulty with activities of daily living. I have asked that the patient make a follow up appointment in 4 weeks where this issue will be further evaluated and addressed.Answers submitted by the patient for this visit:  Patient Health Questionnaire (Submitted on 10/4/2023)  If you checked off any problems, how difficult have these problems made it for you to do your work, take care of things at home, or get along with other people?: Not difficult at all  PHQ9 TOTAL SCORE: 2  Annual Preventive Visit (Submitted on 9/27/2023)  Chief Complaint: Annual Exam:  In general, how would you rate your overall physical health?: good  Frequency of exercise:: 2-3 days/week  Do you usually eat at least 4 servings of fruit and vegetables a day, include whole grains & fiber, and avoid regularly eating high fat or \"junk\" foods? : No  Medication side effects:: Not applicable  Activities of Daily Living: telephone requires assistance, no assistance needed  Home safety: no safety concerns identified  Hearing Impairment:: no hearing concerns  In the past 6 months, have you been bothered by leaking of urine?: No  abdominal pain: No  Blood in stool: No  Blood in urine: No  chest pain: No  chills: No  congestion: No  constipation: No  cough: No  diarrhea: No  dizziness: No  ear pain: No  eye pain: No  nervous/anxious: No  fever: No  frequency: No  genital sores: No  headaches: No  hearing loss: No  heartburn: No  arthralgias: No  joint swelling: No  peripheral edema: No  mood changes: No  myalgias: No  nausea: No  dysuria: No  palpitations: No  Skin sensation changes: No  sore throat: No  urgency: No  rash: No  shortness of breath: No  visual disturbance: No  weakness: No  pelvic pain: No  vaginal bleeding: No  vaginal discharge: No  tenderness: " No  breast mass: No  breast discharge: No  In general, how would you rate your overall mental or emotional health?: good  Additional concerns today:: Yes  Exercise outside of work (Submitted on 9/27/2023)  Chief Complaint: Annual Exam:  Duration of exercise:: 30-45 minutes

## 2023-10-04 NOTE — PROGRESS NOTES
"SUBJECTIVE:   Mary is a 73 year old who presents for Preventive Visit.      10/4/2023     9:58 AM   Additional Questions   Roomed by Yee Powers   Accompanied by none       Are you in the first 12 months of your Medicare coverage?  No    Healthy Habits:     In general, how would you rate your overall health?  Good    Frequency of exercise:  2-3 days/week    Duration of exercise:  30-45 minutes    Do you usually eat at least 4 servings of fruit and vegetables a day, include whole grains    & fiber and avoid regularly eating high fat or \"junk\" foods?  No    Medication side effects:  Not applicable    Ability to successfully perform activities of daily living:  Telephone requires assistance and no assistance needed    Home Safety:  No safety concerns identified    Hearing Impairment:  No hearing concerns    In the past 6 months, have you been bothered by leaking of urine?  No    In general, how would you rate your overall mental or emotional health?  Good    Additional concerns today:  Yes      Today's PHQ-9 Score:       10/4/2023     9:34 AM   PHQ-9 SCORE   PHQ-9 Total Score MyChart 2 (Minimal depression)   PHQ-9 Total Score 2           Have you ever done Advance Care Planning? (For example, a Health Directive, POLST, or a discussion with a medical provider or your loved ones about your wishes): Yes, patient states has an Advance Care Planning document and will bring a copy to the clinic.       Fall risk  Fallen 2 or more times in the past year?: No  Any fall with injury in the past year?: No  click delete button to remove this line now  Cognitive Screening   1) Repeat 3 items (Leader, Season, Table)    2) Clock draw: NORMAL  3) 3 item recall: Recalls 3 objects  Results: 3 items recalled: COGNITIVE IMPAIRMENT LESS LIKELY    Mini-CogTM Copyright S Kishore. Licensed by the author for use in [facility]; reprinted with permission (pk@.[facility]). All rights reserved.      Do you have sleep apnea, excessive " snoring or daytime drowsiness? : yes    Reviewed and updated as needed this visit by clinical staff   Tobacco  Allergies  Meds              Reviewed and updated as needed this visit by Provider                 Social History     Tobacco Use    Smoking status: Former     Packs/day: 1.00     Years: 37.00     Pack years: 37.00     Types: Cigarettes     Quit date: 2005     Years since quittin.7     Passive exposure: Never    Smokeless tobacco: Never    Tobacco comments:     No exposure   Substance Use Topics    Alcohol use: Yes     Alcohol/week: 2.5 standard drinks of alcohol             2023     9:53 AM   Alcohol Use   Prescreen: >3 drinks/day or >7 drinks/week? No          No data to display              Do you have a current opioid prescription? No  Do you use any other controlled substances or medications that are not prescribed by a provider? None          Hyperlipidemia Follow-Up    Are you regularly taking any medication or supplement to lower your cholesterol?   Yes- lipitor  Are you having muscle aches or other side effects that you think could be caused by your cholesterol lowering medication?  No    Hypothyroidism Follow-up    Since last visit, patient describes the following symptoms: Weight stable, no hair loss, no skin changes, no constipation, no loose stools    Current providers sharing in care for this patient include:   Patient Care Team:  Jovita Maxwell MD as PCP - General (Family Medicine)  America Coronado NP as Assigned PCP  Brittaney Ba MD as Assigned Surgical Provider    The following health maintenance items are reviewed in Epic and correct as of today:  Health Maintenance   Topic Date Due    MEDICARE ANNUAL WELLNESS VISIT  2023    INFLUENZA VACCINE (1) 2023    COVID-19 Vaccine ( season) 2023    TSH W/FREE T4 REFLEX  2024    ANNUAL REVIEW OF HM ORDERS  2024    PHQ-9  2024    DEXA  2024    FALL RISK ASSESSMENT  10/04/2024     "MAMMO SCREENING  02/27/2025    COLORECTAL CANCER SCREENING  01/15/2026    ADVANCE CARE PLANNING  08/19/2027    LIPID  03/23/2028    DTAP/TDAP/TD IMMUNIZATION (4 - Td or Tdap) 09/07/2028    HEPATITIS C SCREENING  Completed    DEPRESSION ACTION PLAN  Completed    Pneumococcal Vaccine: 65+ Years  Completed    ZOSTER IMMUNIZATION  Completed    IPV IMMUNIZATION  Aged Out    HPV IMMUNIZATION  Aged Out    MENINGITIS IMMUNIZATION  Aged Out     Lab work is in process  Labs reviewed in EPIC          Review of Systems   Constitutional:  Negative for chills and fever.   HENT:  Negative for congestion, ear pain, hearing loss and sore throat.    Eyes:  Negative for pain and visual disturbance.   Respiratory:  Negative for cough and shortness of breath.    Cardiovascular:  Negative for chest pain, palpitations and peripheral edema.   Gastrointestinal:  Negative for abdominal pain, constipation, diarrhea, heartburn, hematochezia and nausea.   Breasts:  Negative for tenderness, breast mass and discharge.   Genitourinary:  Negative for dysuria, frequency, genital sores, hematuria, pelvic pain, urgency, vaginal bleeding and vaginal discharge.   Musculoskeletal:  Negative for arthralgias, joint swelling and myalgias.   Skin:  Negative for rash.   Neurological:  Negative for dizziness, weakness, headaches and paresthesias.   Psychiatric/Behavioral:  Negative for mood changes. The patient is not nervous/anxious.          OBJECTIVE:   /84 (BP Location: Left arm, Patient Position: Sitting, Cuff Size: Adult Regular)   Pulse 73   Temp 98.1  F (36.7  C) (Oral)   Resp 14   Ht 1.575 m (5' 2\")   Wt 75.8 kg (167 lb)   SpO2 97%   BMI 30.54 kg/m   Estimated body mass index is 30.54 kg/m  as calculated from the following:    Height as of this encounter: 1.575 m (5' 2\").    Weight as of this encounter: 75.8 kg (167 lb).  Physical Exam  GENERAL APPEARANCE: healthy, alert and no distress  EYES: Eyes grossly normal to inspection, PERRL and " conjunctivae and sclerae normal  HENT: ear canals and TM's normal, nose and mouth without ulcers or lesions, oropharynx clear and oral mucous membranes moist  NECK: no adenopathy, no asymmetry, masses, or scars and thyroid normal to palpation  RESP: lungs clear to auscultation - no rales, rhonchi or wheezes  BREAST: normal without masses, tenderness or nipple discharge and no palpable axillary masses or adenopathy  CV: regular rate and rhythm, normal S1 S2, no S3 or S4, no murmur, click or rub, no peripheral edema and peripheral pulses strong  ABDOMEN: soft, nontender, no hepatosplenomegaly, no masses and bowel sounds normal  MS: no musculoskeletal defects are noted and gait is age appropriate without ataxia  SKIN: no suspicious lesions or rashes  NEURO: Normal strength and tone, sensory exam grossly normal, mentation intact and speech normal  PSYCH: mentation appears normal and affect normal/bright    Diagnostic Test Results:  Labs reviewed in Epic    ASSESSMENT / PLAN:   (Z00.00) Encounter for Medicare annual wellness exam  (primary encounter diagnosis)  Comment: encourage annual wellness visit and vaccine up date  Plan: pt prefers to do vaccine at pharmacy.     (E03.9) Hypothyroidism, unspecified type  Comment: doing well and stable. Asymptomatic   Plan: levothyroxine (SYNTHROID/LEVOTHROID) 112 MCG         tablet, TSH with free T4 reflex        Will follow-up lab and continue current medication.     (E78.5) Hyperlipidemia, unspecified hyperlipidemia type  Comment: pt takes lipitor 80 mg and no side effect. Former smoker and quit at 2005. No history of cad, db, htn etc.   Plan: atorvastatin (LIPITOR) 80 MG tablet, Lipid         panel reflex to direct LDL Fasting,         Comprehensive metabolic panel (BMP + Alb, Alk         Phos, ALT, AST, Total. Bili, TP), CBC with         platelets        Pt has concern of high dose of lipitor. We discussed that if her cholesterol  well continue, she can consider to decrease the  "dose of lipitor to 40 mg but then she needs to follow-up in 3 month to monitor.     (L98.9) Skin lesion  Comment: pt follow-up with dermatologist for skin check   Plan: Adult Dermatology Referral            Patient has been advised of split billing requirements and indicates understanding: Yes      COUNSELING:  Reviewed preventive health counseling, as reflected in patient instructions       Regular exercise       Healthy diet/nutrition       Vision screening       Hearing screening       Dental care       Bladder control       Fall risk prevention       Immunizations  Declined: Covid-19 and Influenza due to Other she will do vaccine at pharmacy             Osteoporosis prevention/bone health       Consider lung cancer screening for ages 55-80 years (77 for Medicare) and 20 pack-year smoking history         Colon cancer screening      BMI:   Estimated body mass index is 30.54 kg/m  as calculated from the following:    Height as of this encounter: 1.575 m (5' 2\").    Weight as of this encounter: 75.8 kg (167 lb).   Weight management plan: Discussed healthy diet and exercise guidelines      She reports that she quit smoking about 18 years ago. Her smoking use included cigarettes. She has a 37.00 pack-year smoking history. She has never been exposed to tobacco smoke. She has never used smokeless tobacco.      Appropriate preventive services were discussed with this patient, including applicable screening as appropriate for fall prevention, nutrition, physical activity, Tobacco-use cessation, weight loss and cognition.  Checklist reviewing preventive services available has been given to the patient.    Reviewed patients plan of care and provided an AVS. The Basic Care Plan (routine screening as documented in Health Maintenance) for Sarah Beth meets the Care Plan requirement. This Care Plan has been established and reviewed with the Patient.          Jovita Maxwell MD  [facility] " Risks:  I have reviewed Opioid Use Disorder and Substance Use Disorder risk factors and made any needed referrals. Answers submitted by the patient for this visit:  Patient Health Questionnaire (Submitted on 10/4/2023)  If you checked off any problems, how difficult have these problems made it for you to do your work, take care of things at home, or get along with other people?: Not difficult at all  PHQ9 TOTAL SCORE: 2

## 2023-10-05 LAB
ALBUMIN SERPL BCG-MCNC: 4.7 G/DL (ref 3.5–5.2)
ALP SERPL-CCNC: 130 U/L (ref 35–104)
ALT SERPL W P-5'-P-CCNC: 32 U/L (ref 0–50)
ANION GAP SERPL CALCULATED.3IONS-SCNC: 14 MMOL/L (ref 7–15)
AST SERPL W P-5'-P-CCNC: 58 U/L (ref 0–45)
BILIRUB SERPL-MCNC: 1.1 MG/DL
BUN SERPL-MCNC: 16.3 MG/DL (ref 8–23)
CALCIUM SERPL-MCNC: 10 MG/DL (ref 8.8–10.2)
CHLORIDE SERPL-SCNC: 95 MMOL/L (ref 98–107)
CHOLEST SERPL-MCNC: 243 MG/DL
CREAT SERPL-MCNC: 0.71 MG/DL (ref 0.51–0.95)
DEPRECATED HCO3 PLAS-SCNC: 27 MMOL/L (ref 22–29)
EGFRCR SERPLBLD CKD-EPI 2021: 90 ML/MIN/1.73M2
GLUCOSE SERPL-MCNC: 124 MG/DL (ref 70–99)
HDLC SERPL-MCNC: 73 MG/DL
LDLC SERPL CALC-MCNC: 127 MG/DL
NONHDLC SERPL-MCNC: 170 MG/DL
POTASSIUM SERPL-SCNC: 4.7 MMOL/L (ref 3.4–5.3)
PROT SERPL-MCNC: 7.8 G/DL (ref 6.4–8.3)
SODIUM SERPL-SCNC: 136 MMOL/L (ref 135–145)
TRIGL SERPL-MCNC: 216 MG/DL
TSH SERPL DL<=0.005 MIU/L-ACNC: 1.22 UIU/ML (ref 0.3–4.2)

## 2023-10-11 ENCOUNTER — MYC REFILL (OUTPATIENT)
Dept: FAMILY MEDICINE | Facility: CLINIC | Age: 73
End: 2023-10-11
Payer: COMMERCIAL

## 2023-10-11 DIAGNOSIS — M85.80 OSTEOPENIA, UNSPECIFIED LOCATION: ICD-10-CM

## 2023-10-11 DIAGNOSIS — H40.003 GLAUCOMA SUSPECT, BILATERAL: Primary | ICD-10-CM

## 2023-10-12 RX ORDER — LATANOPROST 50 UG/ML
1 SOLUTION/ DROPS OPHTHALMIC DAILY
Qty: 7.5 ML | Refills: 3 | Status: SHIPPED | OUTPATIENT
Start: 2023-10-12

## 2023-10-12 RX ORDER — TIMOLOL MALEATE 5 MG/ML
1 SOLUTION/ DROPS OPHTHALMIC 2 TIMES DAILY
Qty: 10 ML | Refills: 3 | Status: SHIPPED | OUTPATIENT
Start: 2023-10-12

## 2023-10-30 ENCOUNTER — NURSE TRIAGE (OUTPATIENT)
Dept: FAMILY MEDICINE | Facility: CLINIC | Age: 73
End: 2023-10-30
Payer: COMMERCIAL

## 2023-10-30 ENCOUNTER — TRANSFERRED RECORDS (OUTPATIENT)
Dept: HEALTH INFORMATION MANAGEMENT | Facility: CLINIC | Age: 73
End: 2023-10-30
Payer: COMMERCIAL

## 2023-10-30 NOTE — TELEPHONE ENCOUNTER
Provider Response to 2nd Level Triage Request    I have reviewed the RN documentation. My recommendation is:  Best addressed by PCP/specialist as soon as possible. If the nose bleeding again and not able to stop, pt needs to go to [facility].     Advise pt to apply pressure with she has nose bleeding.     Jovita Maxwell MD on 10/30/2023 at 9:28 AM;

## 2023-10-30 NOTE — TELEPHONE ENCOUNTER
Nurse Triage SBAR    Is this a 2nd Level Triage? YES, LICENSED PRACTITIONER REVIEW IS REQUIRED    Situation: Nosebleeds right nostril twice a day x10 days.    Background: No history of nosebleeds, clotting disorders or use of Aspirin or blood thinners.    Assessment: Bleeding from right nostril lasting up to 2 hours with clots.  Patient has not experienced before.  On no blood thinners.    Protocol Recommended Disposition:   See in Office Today--Patient wants to know if provider will refer her to ENT or help her get an appointment.  She is not having active bleeding at this time.  Last bleeding was last evening and it took almost 2 hours to stop the bleeding.      Recommendation: Route to provider for direction or if bleeding starts again, directed to be seen in ER.       Routed to provider    Does the patient meet one of the following criteria for ADS visit consideration? 16+ years old, with an [facility] PCP

## 2023-10-30 NOTE — TELEPHONE ENCOUNTER
Reason for Disposition   Patient wants to be seen    Additional Information   Negative: Fainted (passed out), or too weak to stand following large blood loss   Negative: Sounds like a life-threatening emergency to the triager   Negative: Nosebleed followed nose injury   Negative: Bleeding now and second call after being instructed in correct technique of direct pressure   Negative: Bleeding present > 30 minutes and using correct method of direct pressure   Negative: Lightheadedness or dizziness   Negative: Pale skin (pallor) of new-onset or worsening   Negative: Has nasal packing (inserted by health care provider to control bleeding) and now has new rash   Negative: Has nasal packing and now has bleeding around the packing  (Exception: Few drops or ooze.)   Negative: Patient sounds very sick or weak to the triager   Negative: Bleeding recurs 3 or more times in 24 hours despite direct pressure   Negative: Taking Coumadin (warfarin) or other strong blood thinner, or known bleeding disorder (e.g., thrombocytopenia)   Negative: Has skin bruises or bleeding gums that are not caused by an injury   Negative: Has nasal packing and now has fever > 100.4 F (38.0 C)    Protocols used: Nosebleed-A-OH

## 2023-12-08 ENCOUNTER — OFFICE VISIT (OUTPATIENT)
Dept: FAMILY MEDICINE | Facility: CLINIC | Age: 73
End: 2023-12-08
Payer: COMMERCIAL

## 2023-12-08 VITALS
BODY MASS INDEX: 30.55 KG/M2 | WEIGHT: 166 LBS | SYSTOLIC BLOOD PRESSURE: 120 MMHG | DIASTOLIC BLOOD PRESSURE: 76 MMHG | TEMPERATURE: 100.6 F | HEIGHT: 62 IN | RESPIRATION RATE: 16 BRPM | HEART RATE: 96 BPM | OXYGEN SATURATION: 94 %

## 2023-12-08 DIAGNOSIS — R79.89 ELEVATED LFTS: ICD-10-CM

## 2023-12-08 DIAGNOSIS — E78.5 HYPERLIPIDEMIA, UNSPECIFIED HYPERLIPIDEMIA TYPE: Primary | ICD-10-CM

## 2023-12-08 DIAGNOSIS — J02.9 SORE THROAT: ICD-10-CM

## 2023-12-08 LAB
ALBUMIN SERPL BCG-MCNC: 4.6 G/DL (ref 3.5–5.2)
ALP SERPL-CCNC: 95 U/L (ref 40–150)
ALT SERPL W P-5'-P-CCNC: 29 U/L (ref 0–50)
AST SERPL W P-5'-P-CCNC: 32 U/L (ref 0–45)
BILIRUB DIRECT SERPL-MCNC: <0.2 MG/DL (ref 0–0.3)
BILIRUB SERPL-MCNC: 0.3 MG/DL
CHOLEST SERPL-MCNC: 160 MG/DL
FASTING STATUS PATIENT QL REPORTED: ABNORMAL
HDLC SERPL-MCNC: 47 MG/DL
LDLC SERPL CALC-MCNC: 96 MG/DL
NONHDLC SERPL-MCNC: 113 MG/DL
PROT SERPL-MCNC: 8 G/DL (ref 6.4–8.3)
TRIGL SERPL-MCNC: 83 MG/DL

## 2023-12-08 PROCEDURE — 99214 OFFICE O/P EST MOD 30 MIN: CPT | Performed by: NURSE PRACTITIONER

## 2023-12-08 PROCEDURE — 80076 HEPATIC FUNCTION PANEL: CPT | Performed by: NURSE PRACTITIONER

## 2023-12-08 PROCEDURE — 80061 LIPID PANEL: CPT | Performed by: NURSE PRACTITIONER

## 2023-12-08 PROCEDURE — 36415 COLL VENOUS BLD VENIPUNCTURE: CPT | Performed by: NURSE PRACTITIONER

## 2023-12-08 ASSESSMENT — ENCOUNTER SYMPTOMS: FEVER: 1

## 2023-12-08 NOTE — PROGRESS NOTES
Assessment & Plan     (E78.5) Hyperlipidemia, unspecified hyperlipidemia type  (primary encounter diagnosis)  Comment: She is frustrated with her elevated cholesterol numbers despite being on the max dose of atorvastatin.  She would like to talk with cardiology and I think that is a reasonable neck step.  She is not at high risk for cardiovascular event given that she does not have diabetes and has not had a cardiovascular incident however she is a former cigarette smoker and not much is known about her family history so I think she would benefit from consult with cardiology due to determine what her LDL goal is and the best way to achieve that.  Counseled also on dietary changes.  She has eliminated alcohol since she saw she had elevated liver function tests 8 weeks ago.  She used to have 1 cocktail in the evening, unclear how much alcohol was in that 1 cocktail but she decided that was not worth the risk and she has stopped drinking completely.  Plan: Adult Cardiology Eval [place] Referral, Lipid        panel reflex to direct LDL Fasting            (R79.89) Elevated LFTs  Comment:   Plan: Hepatic function panel            (J02.9) Sore throat  Comment:   Plan: Onset of fever and sore throat yesterday.  Sore throat has resolved she remains a little feverish.  She is going to keep hydrated and use Tylenol and see how this unfolds over the weekend, she can certainly use urgent care if she needs to.      30 minutes and chart preparation, face-to-face and documentation    Addendum:    Patient's lab work returned at the end of the day today and her LDL is below 100 and her liver function tests are now in the normal range.  Counseled that we probably do not need to have her see cardiology after all and she is in agreement with the plan.  Will repeat lipids in 1 year.           Charmaine Estevez NP  [facility]    Beth Hernandez is a 73 year old, presenting for the following [facility]  "issues:    Her PA left the practice in [place].      She saw nick ENT Oct 20, cauterized. Last nigh    Cardiac--dad  of heart anneurysm  Sister--age 60  uterine cancer      RECHECK (Elevated liver tests done in October. Discuss other abnormal labs. ), Epistaxis (Had another nosebleed last night that did not last long but has hx and did see ENT and was cauterized. ), and Fever (Sx started yesterday with HA, fatigue. Negative home COVID test yesterday. )        2023     9:52 AM   Additional Questions   Roomed by LA       Epistaxis  Associated symptoms include a fever.   Fever  Associated symptoms include a fever.   History of Present Illness       Reason for visit:  Lab results    She eats 2-3 servings of fruits and vegetables daily.She consumes 0 sweetened beverage(s) daily.She exercises with enough effort to increase her heart rate 20 to 29 minutes per day.  She exercises with enough effort to increase her heart rate 4 days per week.   She is taking medications regularly.                 Review of Systems   Constitutional:  Positive for fever.   HENT:  Positive for nosebleeds.             Objective    /76 (BP Location: Right arm, Patient Position: Sitting, Cuff Size: Adult Large)   Pulse 96   Temp (!) 100.6  F (38.1  C) (Tympanic)   Resp 16   Ht 1.575 m (5' 2\")   Wt 75.3 kg (166 lb)   SpO2 94%   BMI 30.36 kg/m    Body mass index is 30.36 kg/m .  Physical Exam                         "

## 2023-12-08 NOTE — PATIENT INSTRUCTIONS
Calcium citrate 600mg daily PLUS 600mg calcium through food/drink  Vitamin D3 1000 international unit(s) daily

## 2024-02-12 ENCOUNTER — PATIENT OUTREACH (OUTPATIENT)
Dept: CARE COORDINATION | Facility: CLINIC | Age: 74
End: 2024-02-12
Payer: COMMERCIAL

## 2024-02-28 ENCOUNTER — HOSPITAL ENCOUNTER (OUTPATIENT)
Dept: MAMMOGRAPHY | Facility: CLINIC | Age: 74
Discharge: HOME | End: 2024-02-28
Admitting: NURSE PRACTITIONER
Payer: COMMERCIAL

## 2024-02-28 DIAGNOSIS — Z12.31 VISIT FOR SCREENING MAMMOGRAM: ICD-10-CM

## 2024-02-28 PROCEDURE — 77063 BREAST TOMOSYNTHESIS BI: CPT

## 2024-09-04 ENCOUNTER — PATIENT OUTREACH (OUTPATIENT)
Dept: CARE COORDINATION | Facility: CLINIC | Age: 74
End: 2024-09-04
Payer: COMMERCIAL

## 2024-09-19 DIAGNOSIS — E78.5 HYPERLIPIDEMIA, UNSPECIFIED HYPERLIPIDEMIA TYPE: ICD-10-CM

## 2024-09-19 DIAGNOSIS — H40.003 GLAUCOMA SUSPECT, BILATERAL: ICD-10-CM

## 2024-09-19 DIAGNOSIS — E03.9 HYPOTHYROIDISM, UNSPECIFIED TYPE: ICD-10-CM

## 2024-09-19 RX ORDER — ATORVASTATIN CALCIUM 80 MG/1
80 TABLET, FILM COATED ORAL AT BEDTIME
Qty: 90 TABLET | Refills: 0 | Status: SHIPPED | OUTPATIENT
Start: 2024-09-19

## 2024-09-19 RX ORDER — LEVOTHYROXINE SODIUM 112 UG/1
112 TABLET ORAL
Qty: 90 TABLET | Refills: 0 | Status: SHIPPED | OUTPATIENT
Start: 2024-09-19

## 2024-09-19 RX ORDER — LATANOPROST 50 UG/ML
SOLUTION/ DROPS OPHTHALMIC
Qty: 7.5 ML | Refills: 3 | OUTPATIENT
Start: 2024-09-19

## 2024-10-20 SDOH — HEALTH STABILITY: PHYSICAL HEALTH: ON AVERAGE, HOW MANY DAYS PER WEEK DO YOU ENGAGE IN MODERATE TO STRENUOUS EXERCISE (LIKE A BRISK WALK)?: 4 DAYS

## 2024-10-20 SDOH — HEALTH STABILITY: PHYSICAL HEALTH: ON AVERAGE, HOW MANY MINUTES DO YOU ENGAGE IN EXERCISE AT THIS LEVEL?: 60 MIN

## 2024-10-20 ASSESSMENT — SOCIAL DETERMINANTS OF HEALTH (SDOH): HOW OFTEN DO YOU GET TOGETHER WITH FRIENDS OR RELATIVES?: MORE THAN THREE TIMES A WEEK

## 2024-10-23 ENCOUNTER — OFFICE VISIT (OUTPATIENT)
Dept: FAMILY MEDICINE | Facility: CLINIC | Age: 74
End: 2024-10-23
Attending: FAMILY MEDICINE
Payer: COMMERCIAL

## 2024-10-23 VITALS
HEART RATE: 70 BPM | DIASTOLIC BLOOD PRESSURE: 82 MMHG | RESPIRATION RATE: 16 BRPM | HEIGHT: 62 IN | TEMPERATURE: 97.9 F | SYSTOLIC BLOOD PRESSURE: 136 MMHG | BODY MASS INDEX: 29.88 KG/M2 | WEIGHT: 162.4 LBS | OXYGEN SATURATION: 96 %

## 2024-10-23 DIAGNOSIS — E78.5 HYPERLIPIDEMIA, UNSPECIFIED HYPERLIPIDEMIA TYPE: ICD-10-CM

## 2024-10-23 DIAGNOSIS — J01.90 ACUTE SINUSITIS WITH SYMPTOMS > 10 DAYS: ICD-10-CM

## 2024-10-23 DIAGNOSIS — E03.9 HYPOTHYROIDISM, UNSPECIFIED TYPE: Primary | ICD-10-CM

## 2024-10-23 DIAGNOSIS — Z78.0 ASYMPTOMATIC MENOPAUSE: ICD-10-CM

## 2024-10-23 DIAGNOSIS — Z00.00 ENCOUNTER FOR MEDICARE ANNUAL WELLNESS EXAM: ICD-10-CM

## 2024-10-23 PROCEDURE — G0439 PPPS, SUBSEQ VISIT: HCPCS | Performed by: NURSE PRACTITIONER

## 2024-10-23 PROCEDURE — 36415 COLL VENOUS BLD VENIPUNCTURE: CPT | Performed by: NURSE PRACTITIONER

## 2024-10-23 PROCEDURE — 99214 OFFICE O/P EST MOD 30 MIN: CPT | Mod: 25 | Performed by: NURSE PRACTITIONER

## 2024-10-23 PROCEDURE — 80061 LIPID PANEL: CPT | Performed by: NURSE PRACTITIONER

## 2024-10-23 PROCEDURE — 84443 ASSAY THYROID STIM HORMONE: CPT | Performed by: NURSE PRACTITIONER

## 2024-10-23 RX ORDER — AMOXICILLIN 875 MG/1
875 TABLET, COATED ORAL 2 TIMES DAILY
Qty: 20 TABLET | Refills: 0 | Status: SHIPPED | OUTPATIENT
Start: 2024-10-23

## 2024-10-23 RX ORDER — LEVOTHYROXINE SODIUM 112 UG/1
112 TABLET ORAL
Qty: 90 TABLET | Refills: 0 | Status: CANCELLED | OUTPATIENT
Start: 2024-10-23

## 2024-10-23 RX ORDER — ATORVASTATIN CALCIUM 80 MG/1
80 TABLET, FILM COATED ORAL AT BEDTIME
Qty: 90 TABLET | Refills: 0 | Status: CANCELLED | OUTPATIENT
Start: 2024-10-23

## 2024-10-23 ASSESSMENT — PATIENT HEALTH QUESTIONNAIRE - PHQ9
SUM OF ALL RESPONSES TO PHQ QUESTIONS 1-9: 3
10. IF YOU CHECKED OFF ANY PROBLEMS, HOW DIFFICULT HAVE THESE PROBLEMS MADE IT FOR YOU TO DO YOUR WORK, TAKE CARE OF THINGS AT HOME, OR GET ALONG WITH OTHER PEOPLE: NOT DIFFICULT AT ALL
SUM OF ALL RESPONSES TO PHQ QUESTIONS 1-9: 3

## 2024-10-23 ASSESSMENT — ANXIETY QUESTIONNAIRES
GAD7 TOTAL SCORE: 0
7. FEELING AFRAID AS IF SOMETHING AWFUL MIGHT HAPPEN: NOT AT ALL
7. FEELING AFRAID AS IF SOMETHING AWFUL MIGHT HAPPEN: NOT AT ALL
1. FEELING NERVOUS, ANXIOUS, OR ON EDGE: NOT AT ALL
6. BECOMING EASILY ANNOYED OR IRRITABLE: NOT AT ALL
IF YOU CHECKED OFF ANY PROBLEMS ON THIS QUESTIONNAIRE, HOW DIFFICULT HAVE THESE PROBLEMS MADE IT FOR YOU TO DO YOUR WORK, TAKE CARE OF THINGS AT HOME, OR GET ALONG WITH OTHER PEOPLE: NOT DIFFICULT AT ALL
GAD7 TOTAL SCORE: 0
3. WORRYING TOO MUCH ABOUT DIFFERENT THINGS: NOT AT ALL
5. BEING SO RESTLESS THAT IT IS HARD TO SIT STILL: NOT AT ALL
2. NOT BEING ABLE TO STOP OR CONTROL WORRYING: NOT AT ALL
4. TROUBLE RELAXING: NOT AT ALL
8. IF YOU CHECKED OFF ANY PROBLEMS, HOW DIFFICULT HAVE THESE MADE IT FOR YOU TO DO YOUR WORK, TAKE CARE OF THINGS AT HOME, OR GET ALONG WITH OTHER PEOPLE?: NOT DIFFICULT AT ALL
GAD7 TOTAL SCORE: 0

## 2024-10-23 NOTE — PROGRESS NOTES
"Preventive Care Visit  [facility]  Charmaine Estevez NP, Family Medicine  Oct 23, 2024      Assessment & Plan     (E03.9) Hypothyroidism, unspecified type  (primary encounter diagnosis)  Comment:   Plan: TSH WITH FREE T4 REFLEX        Due for labs    (E78.5) Hyperlipidemia, unspecified hyperlipidemia type  Comment:   Plan: Lipid panel reflex to direct LDL Fasting        Statin increased 2 years ago to 80mg, she has lost #5 in the past year and owuld like to reduce statin if ldl closer to 80, await labs    (J01.90) Acute sinusitis with symptoms > 10 days  Comment:   Plan: amoxicillin (AMOXIL) 875 MG tablet        Nasal congestion/pressure cough for 3 weeks. No fever but can't resolved despite Mucinex. No fever    (Z78.0) Asymptomatic menopause  Comment:   Plan: DEXA HIP/PELVIS/SPINE - Future        States hx of osteopenia, never osteoporosis    (Z00.00) Encounter for Medicare annual wellness exam  Comment:   Plan:             BMI  Estimated body mass index is 29.7 kg/m  as calculated from the following:    Height as of this encounter: 1.575 m (5' 2\").    Weight as of this encounter: 73.7 kg (162 lb 6.4 oz).   Weight management plan: Discussed healthy diet and exercise guidelines    Counseling  Appropriate preventive services were addressed with this patient via screening, questionnaire, or discussion as appropriate for fall prevention, nutrition, physical activity, Tobacco-use cessation, social engagement, weight loss and cognition.  Checklist reviewing preventive services available has been given to the patient.  Reviewed patient's diet, addressing concerns and/or questions.   She is at risk for psychosocial distress and has been provided with information to reduce risk.   Patient reported safety concerns were addressed today.        Beth Hernandez is a 74 year old, presenting for the following: patient is here for yearly Medicare Visit  Physical (Medicare Annual Wellness Visit)        " 10/23/2024     3:06 PM   Additional Questions   Roomed by bin lyles   Accompanied by self         Vision screening was not completed today as this is not a Welcome to Medicare Visit and patient has been covered by Medicare >1 year. Patient does wear glasses/contacts. Last eye exam was 12/2023 ([facility] [place]).     HPI    Hyperlipidemia Follow-Up    Are you regularly taking any medication or supplement to lower your cholesterol?   Yes- Atorvastatin  Are you having muscle aches or other side effects that you think could be caused by your cholesterol lowering medication?  No      Health Care Directive  Patient does not have a Health Care Directive: Patient states has Advance Directive and will bring in a copy to clinic.      10/20/2024   General Health   How would you rate your overall physical health? Good   Feel stress (tense, anxious, or unable to sleep) Only a little      (!) STRESS CONCERN      10/20/2024   Nutrition   Diet: Breakfast skipped            10/20/2024   Exercise   Days per week of moderate/strenous exercise 4 days   Average minutes spent exercising at this level 60 min            10/20/2024   Social Factors   Frequency of gathering with friends or relatives More than three times a week   Worry food won't last until get money to buy more No   Food not last or not have enough money for food? No   Do you have housing? (Housing is defined as stable permanent housing and does not include staying ouside in a car, in a tent, in an abandoned building, in an overnight shelter, or couch-surfing.) Yes   Are you worried about losing your housing? No   Lack of transportation? No   Unable to get utilities (heat,electricity)? No            10/20/2024   Fall Risk   Fallen 2 or more times in the past year? No     No    Trouble with walking or balance? No     No        Patient-reported    Multiple values from one day are sorted in reverse-chronological order          10/20/2024   Activities of Daily  "Living- Home Safety   Needs help with the following daily activites None of the above   Safety concerns in the home No grab bars in the bathroom            10/20/2024   Dental   Dentist two times every year? Yes            10/20/2024   Hearing Screening   Hearing concerns? None of the above            10/20/2024   Driving Risk Screening   Patient/family members have concerns about driving No            10/20/2024   General Alertness/Fatigue Screening   Have you been more tired than usual lately? No            10/20/2024   Urinary Incontinence Screening   Bothered by leaking urine in past 6 months No            10/20/2024   TB Screening   Were you born outside of the [place]? No          Today's PHQ-9 Score:       10/23/2024     2:58 PM   PHQ-9 SCORE   PHQ-9 Total Score MyChart 3 (Minimal depression)   PHQ-9 Total Score 3        Patient-reported     Answers submitted by the patient for this visit:  MEDICARE ANNUAL WELLNESS VISIT on 10/23/2024  3:30 PM with Charmaine Estevez  Patient Health Questionnaire (G7) (Submitted on 10/23/2024)  LOS 7 TOTAL SCORE: 0          10/20/2024   Substance Use   Alcohol more than 3/day or more than 7/wk No   Do you have a current opioid prescription? No   How severe/bad is pain from 1 to 10? 0/10 (No Pain)   Do you use any other substances recreationally? No        Social History     Tobacco Use    Smoking status: Former     Current packs/day: 0.00     Average packs/day: 1 pack/day for 37.0 years (37.0 ttl pk-yrs)     Types: Cigarettes     Start date: 1968     Quit date: 2005     Years since quittin.8     Passive exposure: Never    Smokeless tobacco: Never    Tobacco comments:     No exposure   Vaping Use    Vaping status: Never Used   Substance Use Topics    Alcohol use: Yes     Alcohol/week: 2.5 standard drinks of alcohol     Types: 3 Standard drinks or equivalent per week     Comment: \"yeah, socially\"    Drug use: No           2023   LAST FHS-7 RESULTS   1st degree relative " breast or ovarian cancer Yes   Any relative bilateral breast cancer No   Any male have breast cancer No   Any ONE woman have BOTH breast AND ovarian cancer No   Any woman with breast cancer before 50yrs No   2 or more relatives with breast AND/OR ovarian cancer No   2 or more relatives with breast AND/OR bowel cancer No           Mammogram Screening - Mammogram every 1-2 years updated in Health Maintenance based on mutual decision making    ASCVD Risk   The 10-year ASCVD risk score (Kristina GONZALEZ, et al., 2019) is: 14.2%    Values used to calculate the score:      Age: 74 years      Sex: Female      Is Non- : No      Diabetic: No      Tobacco smoker: No      Systolic Blood Pressure: 136 mmHg      Is BP treated: No      HDL Cholesterol: 47 mg/dL      Total Cholesterol: 160 mg/dL            Reviewed and updated as needed this visit by Provider                      Current providers sharing in care for this patient include:  Patient Care Team:  Charmaine Estevez NP as PCP - General (Family Medicine)  Brittaney Ba MD as Assigned Surgical Provider  Jovita Maxwell MD as Assigned PCP    The following health maintenance items are reviewed in Epic and correct as of today:  Health Maintenance   Topic Date Due    ANNUAL REVIEW OF HM ORDERS  03/23/2024    DEXA  09/01/2024    MEDICARE ANNUAL WELLNESS VISIT  10/04/2024    TSH W/FREE T4 REFLEX  10/04/2024    LIPID  12/08/2024    MAMMO SCREENING  02/28/2025    FALL RISK ASSESSMENT  10/23/2025    COLORECTAL CANCER SCREENING  01/15/2026    GLUCOSE  10/04/2026    DTAP/TDAP/TD IMMUNIZATION (4 - Td or Tdap) 09/07/2028    ADVANCE CARE PLANNING  10/04/2028    HEPATITIS C SCREENING  Completed    PHQ-2 (once per calendar year)  Completed    INFLUENZA VACCINE  Completed    Pneumococcal Vaccine: 65+ Years  Completed    ZOSTER IMMUNIZATION  Completed    RSV VACCINE  Completed    COVID-19 Vaccine  Completed    HPV IMMUNIZATION  Aged Out    MENINGITIS IMMUNIZATION  Aged  "Out    RSV MONOCLONAL ANTIBODY  Aged Out            Objective    Exam  /82 (BP Location: Right arm, Patient Position: Sitting)   Pulse 70   Temp 97.9  F (36.6  C)   Resp 16   Ht 1.575 m (5' 2\")   Wt 73.7 kg (162 lb 6.4 oz)   LMP  (LMP Unknown)   SpO2 96%   Breastfeeding No   BMI 29.70 kg/m     Estimated body mass index is 29.7 kg/m  as calculated from the following:    Height as of this encounter: 1.575 m (5' 2\").    Weight as of this encounter: 73.7 kg (162 lb 6.4 oz).    Physical Exam  GENERAL: alert and no distress  EYES: Eyes grossly normal to inspection, PERRL and conjunctivae and sclerae normal  HENT: ear canals and TM's normal, nose and mouth without ulcers or lesions  NECK: no adenopathy, no asymmetry, masses, or scars  RESP: lungs clear to auscultation - no rales, rhonchi or wheezes  CV: regular rate and rhythm, normal S1 S2, no S3 or S4, no murmur, click or rub, no peripheral edema  ABDOMEN: soft, nontender, no hepatosplenomegaly, no masses and bowel sounds normal  MS: no gross musculoskeletal defects noted, no edema         10/23/2024   Mini Cog   Clock Draw Score 2 Normal   3 Item Recall 3 objects recalled   Mini Cog Total Score 5                 Signed Electronically by: Charmaine Estevez NP    "

## 2024-10-23 NOTE — PATIENT INSTRUCTIONS
Calcium 1200mg total through food and supplement  Vitamin D3 need 1000 international unit(s) daily    Use hydrocortisone cream or ointment to the eczema on the hands up to twice a day, use cerave in between    Bring copy of living will in  Patient Education   Preventive Care Advice   This is general advice given by our system to help you stay healthy. However, your care team may have specific advice just for you. Please talk to your care team about your preventive care needs.  Nutrition  Eat 5 or more servings of fruits and vegetables each day.  Try wheat bread, brown rice and whole grain pasta (instead of white bread, rice, and pasta).  Get enough calcium and vitamin D. Check the label on foods and aim for 100% of the RDA (recommended daily allowance).  Lifestyle  Exercise at least 150 minutes each week  (30 minutes a day, 5 days a week).  Do muscle strengthening activities 2 days a week. These help control your weight and prevent disease.  No smoking.  Wear sunscreen to prevent skin cancer.  Have a dental exam and cleaning every 6 months.  Yearly exams  See your health care team every year to talk about:  Any changes in your health.  Any medicines your care team has prescribed.  Preventive care, family planning, and ways to prevent chronic diseases.  Shots (vaccines)   HPV shots (up to age 26), if you've never had them before.  Hepatitis B shots (up to age 59), if you've never had them before.  COVID-19 shot: Get this shot when it's due.  Flu shot: Get a flu shot every year.  Tetanus shot: Get a tetanus shot every 10 years.  Pneumococcal, hepatitis A, and RSV shots: Ask your care team if you need these based on your risk.  Shingles shot (for age 50 and up)  General health tests  Diabetes screening:  Starting at age 35, Get screened for diabetes at least every 3 years.  If you are younger than age 35, ask your care team if you should be screened for diabetes.  Cholesterol test: At age 39, start having a cholesterol  test every 5 years, or more often if advised.  Bone density scan (DEXA): At age 50, ask your care team if you should have this scan for osteoporosis (brittle bones).  Hepatitis C: Get tested at least once in your life.  STIs (sexually transmitted infections)  Before age 24: Ask your care team if you should be screened for STIs.  After age 24: Get screened for STIs if you're at risk. You are at risk for STIs (including HIV) if:  You are sexually active with more than one person.  You don't use condoms every time.  You or a partner was diagnosed with a sexually transmitted infection.  If you are at risk for HIV, ask about PrEP medicine to prevent HIV.  Get tested for HIV at least once in your life, whether you are at risk for HIV or not.  Cancer screening tests  Cervical cancer screening: If you have a cervix, begin getting regular cervical cancer screening tests starting at age 21.  Breast cancer scan (mammogram): If you've ever had breasts, begin having regular mammograms starting at age 40. This is a scan to check for breast cancer.  Colon cancer screening: It is important to start screening for colon cancer at age 45.  Have a colonoscopy test every 10 years (or more often if you're at risk) Or, ask your provider about stool tests like a FIT test every year or Cologuard test every 3 years.  To learn more about your testing options, visit:   .  For help making a decision, visit:   https://bit.ly/ot15781.  Prostate cancer screening test: If you have a prostate, ask your care team if a prostate cancer screening test (PSA) at age 55 is right for you.  Lung cancer screening: If you are a current or former smoker ages 50 to 80, ask your care team if ongoing lung cancer screenings are right for you.  For informational purposes only. Not to replace the advice of your health care provider. Copyright   2023 [employer]. All rights reserved. Clinically reviewed by the [facility].  Setred [id number] - REV 01/24.  Learning About Activities of Daily Living  What are activities of daily living?     Activities of daily living (ADLs) are the basic self-care tasks you do every day. These include eating, bathing, dressing, and moving around.  As you age, and if you have health problems, you may find that it's harder to do some of these tasks. If so, your doctor can suggest ideas that may help.  To measure what kind of help you may need, your doctor will ask how well you are able to do ADLs. Let your doctor know if there are any tasks that you are having trouble doing. This is an important first step to getting help. And when you have the help you need, you can stay as independent as possible.  How will a doctor assess your ADLs?  Asking about ADLs is part of a routine health checkup your doctor will likely do as you age. Your health check might be done in a doctor's office, in your home, or at a hospital. The goal is to find out if you are having any problems that could make it hard to care for yourself or that make it unsafe for you to be on your own.  To measure your ADLs, your doctor will ask how hard it is for you to do routine tasks. Your doctor may also want to know if you have changed the way you do a task because of a health problem. Your doctor may watch how you:  Walk back and forth.  Keep your balance while you stand or walk.  Move from sitting to standing or from a bed to a chair.  Button or unbutton a shirt or sweater.  Remove and put on your shoes.  It's common to feel a little worried or anxious if you find you can't do all the things you used to be able to do. Talking with your doctor about ADLs is a way to make sure you're as safe as possible and able to care for yourself as well as you can. You may want to bring a caregiver, friend, or family member to your checkup. They can help you talk to your doctor.  Follow-up care is a key part of your treatment and safety. Be sure to make and  go to all appointments, and call your doctor if you are having problems. It's also a good idea to know your test results and keep a list of the medicines you take.  Current as of: October 24, 2023  Content Version: 14.2 2024 [facility] [employer].   Care instructions adapted under license by your healthcare professional. If you have questions about a medical condition or this instruction, always ask your healthcare professional. [employer] disclaims any warranty or liability for your use of this information.

## 2024-10-24 ENCOUNTER — PATIENT OUTREACH (OUTPATIENT)
Dept: CARE COORDINATION | Facility: CLINIC | Age: 74
End: 2024-10-24
Payer: COMMERCIAL

## 2024-10-24 LAB
CHOLEST SERPL-MCNC: 143 MG/DL
FASTING STATUS PATIENT QL REPORTED: NORMAL
HDLC SERPL-MCNC: 52 MG/DL
LDLC SERPL CALC-MCNC: 72 MG/DL
NONHDLC SERPL-MCNC: 91 MG/DL
TRIGL SERPL-MCNC: 95 MG/DL
TSH SERPL DL<=0.005 MIU/L-ACNC: 0.7 UIU/ML (ref 0.3–4.2)

## 2024-10-24 RX ORDER — LEVOTHYROXINE SODIUM 112 UG/1
112 TABLET ORAL DAILY
Qty: 90 TABLET | Refills: 4 | Status: SHIPPED | OUTPATIENT
Start: 2024-10-24

## 2024-10-24 RX ORDER — ATORVASTATIN CALCIUM 40 MG/1
40 TABLET, FILM COATED ORAL DAILY
Qty: 90 TABLET | Refills: 4 | Status: SHIPPED | OUTPATIENT
Start: 2024-10-24

## 2024-11-06 ENCOUNTER — OFFICE VISIT (OUTPATIENT)
Dept: FAMILY MEDICINE | Facility: CLINIC | Age: 74
End: 2024-11-06
Payer: COMMERCIAL

## 2024-11-06 VITALS
OXYGEN SATURATION: 94 % | HEIGHT: 62 IN | TEMPERATURE: 98.4 F | BODY MASS INDEX: 29.81 KG/M2 | RESPIRATION RATE: 16 BRPM | HEART RATE: 92 BPM | WEIGHT: 162 LBS | DIASTOLIC BLOOD PRESSURE: 94 MMHG | SYSTOLIC BLOOD PRESSURE: 152 MMHG

## 2024-11-06 DIAGNOSIS — J01.00 ACUTE NON-RECURRENT MAXILLARY SINUSITIS: Primary | ICD-10-CM

## 2024-11-06 PROCEDURE — 99213 OFFICE O/P EST LOW 20 MIN: CPT | Performed by: FAMILY MEDICINE

## 2024-11-06 RX ORDER — DOXYCYCLINE HYCLATE 100 MG
100 TABLET ORAL 2 TIMES DAILY
Qty: 14 TABLET | Refills: 0 | Status: SHIPPED | OUTPATIENT
Start: 2024-11-06 | End: 2024-11-13

## 2024-11-06 ASSESSMENT — ENCOUNTER SYMPTOMS
FEVER: 0
SHORTNESS OF BREATH: 0
RHINORRHEA: 1
COUGH: 1
SINUS PAIN: 1
FATIGUE: 1
CHEST TIGHTNESS: 0
SINUS PRESSURE: 1
SORE THROAT: 1

## 2024-11-06 NOTE — PATIENT INSTRUCTIONS
Thank you for visiting us today.    Below are a summary of my recommendations as discussed during your visit:  Drink plenty of fluid as this may thin your nasal secretions.  Take your antibiotic with food, you may consider taking probiotics such as culturelle, florajen, florastor etc to reduce chances of getting secondary diarrhea from the antibiotic. You can get this over the counter  I recommend you use sinus irrigation particularly before you go to bed and in the morning when you wake up, but you may also do it throughout the day. This may improve your cough as it helps remove some mucus plugging that can cause postnasal drip. You may buy a Neti Pot or sinus rinsing bottle such as NeilMed to do this.         Don't hesitate to contact us if you have any questions    Dr Marino (Gordon Guzman MD)

## 2024-11-06 NOTE — ASSESSMENT & PLAN NOTE
Prescribed antibiotics as reflected in the plan. Recommended sinus rinsing  at least before bed and in the morning.  Recommended symptomatic treatment and adequate hydration to help thin nasal secretions.  An after visit summary with the plan of care summarized given to the patient.  Patient to call us if persisting or worsening symptoms.

## 2024-11-06 NOTE — PROGRESS NOTES
Assessment & Plan   Problem List Items Addressed This Visit       Acute non-recurrent maxillary sinusitis - Primary     Prescribed antibiotics as reflected in the plan. Recommended sinus rinsing  at least before bed and in the morning.  Recommended symptomatic treatment and adequate hydration to help thin nasal secretions.  An after visit summary with the plan of care summarized given to the patient.  Patient to call us if persisting or worsening symptoms.          Relevant Medications    doxycycline hyclate (VIBRA-TABS) 100 MG tablet                   eBth Hernandez is a 74 year old, presenting for the following health issues:  Sinus Problem (Sinus infection, x 10 days )    The patient reports over 10 days of sinus pressure, runny nose and nasal congestion as well as cough especially at night and early in the morning.  She was initially treated with amoxicillin without clavulanate for possible sinus infection but she is not doing any better.  She is planning to take a trip to [place] tomorrow by plane.  She denies any associated fever or shortness of breath.      11/6/2024     3:37 PM   Additional Questions   Roomed by RAFAEL Peters     History of Present Illness       Reason for visit:  Coughing    She eats 0-1 servings of fruits and vegetables daily.She consumes 0 sweetened beverage(s) daily.She exercises with enough effort to increase her heart rate 20 to 29 minutes per day.  She exercises with enough effort to increase her heart rate 4 days per week.   She is taking medications regularly.               Review of Systems   Constitutional:  Positive for fatigue. Negative for fever.   HENT:  Positive for congestion, postnasal drip, rhinorrhea, sinus pressure, sinus pain, sneezing and sore throat. Negative for ear discharge and ear pain.    Respiratory:  Positive for cough. Negative for chest tightness and shortness of breath.            Objective    BP (!) 152/94   Pulse 92   Temp 98.4  F (36.9  C) (Oral)  "  Resp 16   Ht 1.575 m (5' 2\")   Wt 73.5 kg (162 lb)   LMP  (LMP Unknown)   SpO2 94%   BMI 29.63 kg/m    Body mass index is 29.63 kg/m .  Physical Exam     On physical exam, the patient appears in no acute distress vital signs were reviewed  ENT examination pupils equally reactive to light, oropharynx is clear and moist.  Nasal passages with edema bilaterally. Maxillary tenderness to palpation bilaterally buit worse on the left side  Neck supple no thyromegaly and no significant adenopathy.  Lungs clear to auscultation bilaterally without adventitious sounds             Signed Electronically by: Gordon Guzman MD    "

## 2024-11-13 DIAGNOSIS — H40.003 GLAUCOMA SUSPECT, BILATERAL: ICD-10-CM

## 2024-11-13 RX ORDER — TIMOLOL MALEATE 5 MG/ML
1 SOLUTION/ DROPS OPHTHALMIC 2 TIMES DAILY
Qty: 10 ML | Refills: 3 | OUTPATIENT
Start: 2024-11-13

## 2024-11-19 DIAGNOSIS — H40.003 GLAUCOMA SUSPECT, BILATERAL: ICD-10-CM

## 2024-11-19 RX ORDER — LATANOPROST 50 UG/ML
1 SOLUTION/ DROPS OPHTHALMIC DAILY
Qty: 7.5 ML | Refills: 3 | Status: CANCELLED | OUTPATIENT
Start: 2024-11-19

## 2024-11-19 RX ORDER — TIMOLOL MALEATE 5 MG/ML
1 SOLUTION/ DROPS OPHTHALMIC 2 TIMES DAILY
Qty: 10 ML | Refills: 3 | Status: CANCELLED | OUTPATIENT
Start: 2024-11-19

## 2024-11-19 NOTE — TELEPHONE ENCOUNTER
These medicines should be coming from an eye doctor, can we check with the patient, has she been seeing someone from the eye clinic?

## 2024-11-19 NOTE — TELEPHONE ENCOUNTER
Please call patient looks like these are supposed to go to patients specialty care provider?    SISI Goodman  [facility]  [phone]    [facility]   Monday  - Thursday 7 AM - 6 PM    Friday  7 AM - 5 PM     -Please call your clinic for assistance from a nurse after hours.

## 2024-11-20 NOTE — TELEPHONE ENCOUNTER
Spoke with patient to relay provider message. She states that she does see [facility] and will contact them.    Caesar Mcmahon RN  [facility]

## 2025-01-29 ENCOUNTER — PATIENT OUTREACH (OUTPATIENT)
Dept: CARE COORDINATION | Facility: CLINIC | Age: 75
End: 2025-01-29
Payer: COMMERCIAL

## 2025-03-03 ENCOUNTER — HOSPITAL ENCOUNTER (OUTPATIENT)
Dept: MAMMOGRAPHY | Facility: CLINIC | Age: 75
Discharge: HOME | End: 2025-03-03
Attending: NURSE PRACTITIONER | Admitting: NURSE PRACTITIONER
Payer: COMMERCIAL

## 2025-03-03 DIAGNOSIS — Z12.31 SCREENING MAMMOGRAM FOR BREAST CANCER: ICD-10-CM

## 2025-03-03 PROCEDURE — 77067 SCR MAMMO BI INCL CAD: CPT

## 2025-03-03 PROCEDURE — 77063 BREAST TOMOSYNTHESIS BI: CPT
